# Patient Record
Sex: FEMALE | Race: BLACK OR AFRICAN AMERICAN | NOT HISPANIC OR LATINO | Employment: FULL TIME | URBAN - METROPOLITAN AREA
[De-identification: names, ages, dates, MRNs, and addresses within clinical notes are randomized per-mention and may not be internally consistent; named-entity substitution may affect disease eponyms.]

---

## 2022-08-31 ENCOUNTER — OFFICE VISIT (OUTPATIENT)
Dept: OBSTETRICS AND GYNECOLOGY | Facility: CLINIC | Age: 47
End: 2022-08-31
Payer: COMMERCIAL

## 2022-08-31 VITALS
WEIGHT: 163.56 LBS | BODY MASS INDEX: 28.98 KG/M2 | SYSTOLIC BLOOD PRESSURE: 116 MMHG | HEIGHT: 63 IN | DIASTOLIC BLOOD PRESSURE: 68 MMHG

## 2022-08-31 DIAGNOSIS — Z12.4 CERVICAL CANCER SCREENING: ICD-10-CM

## 2022-08-31 DIAGNOSIS — Z11.3 SCREEN FOR STD (SEXUALLY TRANSMITTED DISEASE): ICD-10-CM

## 2022-08-31 DIAGNOSIS — A60.04 HERPES SIMPLEX VULVOVAGINITIS: Primary | ICD-10-CM

## 2022-08-31 DIAGNOSIS — Z12.31 ENCOUNTER FOR SCREENING MAMMOGRAM FOR MALIGNANT NEOPLASM OF BREAST: ICD-10-CM

## 2022-08-31 PROCEDURE — 1159F MED LIST DOCD IN RCRD: CPT | Mod: CPTII,S$GLB,, | Performed by: OBSTETRICS & GYNECOLOGY

## 2022-08-31 PROCEDURE — 99999 PR PBB SHADOW E&M-NEW PATIENT-LVL III: ICD-10-PCS | Mod: PBBFAC,,, | Performed by: OBSTETRICS & GYNECOLOGY

## 2022-08-31 PROCEDURE — 3078F PR MOST RECENT DIASTOLIC BLOOD PRESSURE < 80 MM HG: ICD-10-PCS | Mod: CPTII,S$GLB,, | Performed by: OBSTETRICS & GYNECOLOGY

## 2022-08-31 PROCEDURE — 1159F PR MEDICATION LIST DOCUMENTED IN MEDICAL RECORD: ICD-10-PCS | Mod: CPTII,S$GLB,, | Performed by: OBSTETRICS & GYNECOLOGY

## 2022-08-31 PROCEDURE — 88175 CYTOPATH C/V AUTO FLUID REDO: CPT | Performed by: OBSTETRICS & GYNECOLOGY

## 2022-08-31 PROCEDURE — 99204 OFFICE O/P NEW MOD 45 MIN: CPT | Mod: 25,S$GLB,, | Performed by: OBSTETRICS & GYNECOLOGY

## 2022-08-31 PROCEDURE — 87210 SMEAR WET MOUNT SALINE/INK: CPT | Mod: QW,S$GLB,, | Performed by: OBSTETRICS & GYNECOLOGY

## 2022-08-31 PROCEDURE — 99999 PR PBB SHADOW E&M-NEW PATIENT-LVL III: CPT | Mod: PBBFAC,,, | Performed by: OBSTETRICS & GYNECOLOGY

## 2022-08-31 PROCEDURE — 87624 HPV HI-RISK TYP POOLED RSLT: CPT | Performed by: OBSTETRICS & GYNECOLOGY

## 2022-08-31 PROCEDURE — 3074F SYST BP LT 130 MM HG: CPT | Mod: CPTII,S$GLB,, | Performed by: OBSTETRICS & GYNECOLOGY

## 2022-08-31 PROCEDURE — 87491 CHLMYD TRACH DNA AMP PROBE: CPT | Performed by: OBSTETRICS & GYNECOLOGY

## 2022-08-31 PROCEDURE — 3008F BODY MASS INDEX DOCD: CPT | Mod: CPTII,S$GLB,, | Performed by: OBSTETRICS & GYNECOLOGY

## 2022-08-31 PROCEDURE — 3078F DIAST BP <80 MM HG: CPT | Mod: CPTII,S$GLB,, | Performed by: OBSTETRICS & GYNECOLOGY

## 2022-08-31 PROCEDURE — 87625 HPV TYPES 16 & 18 ONLY: CPT | Mod: 59 | Performed by: OBSTETRICS & GYNECOLOGY

## 2022-08-31 PROCEDURE — 3074F PR MOST RECENT SYSTOLIC BLOOD PRESSURE < 130 MM HG: ICD-10-PCS | Mod: CPTII,S$GLB,, | Performed by: OBSTETRICS & GYNECOLOGY

## 2022-08-31 PROCEDURE — 99204 PR OFFICE/OUTPT VISIT, NEW, LEVL IV, 45-59 MIN: ICD-10-PCS | Mod: 25,S$GLB,, | Performed by: OBSTETRICS & GYNECOLOGY

## 2022-08-31 PROCEDURE — 3008F PR BODY MASS INDEX (BMI) DOCUMENTED: ICD-10-PCS | Mod: CPTII,S$GLB,, | Performed by: OBSTETRICS & GYNECOLOGY

## 2022-08-31 PROCEDURE — 87210 PR  SMEAR,STAIN,WET MNT,INTERP: ICD-10-PCS | Mod: QW,S$GLB,, | Performed by: OBSTETRICS & GYNECOLOGY

## 2022-08-31 PROCEDURE — 87591 N.GONORRHOEAE DNA AMP PROB: CPT | Performed by: OBSTETRICS & GYNECOLOGY

## 2022-08-31 RX ORDER — ACYCLOVIR 400 MG/1
400 TABLET ORAL 2 TIMES DAILY
Qty: 60 TABLET | Refills: 11 | Status: SHIPPED | OUTPATIENT
Start: 2022-08-31 | End: 2023-01-09

## 2022-08-31 RX ORDER — MONTELUKAST SODIUM 10 MG/1
10 TABLET ORAL DAILY
COMMUNITY
Start: 2022-07-19

## 2022-08-31 RX ORDER — VALACYCLOVIR HYDROCHLORIDE 500 MG/1
500 TABLET, FILM COATED ORAL 2 TIMES DAILY
COMMUNITY
Start: 2022-08-27 | End: 2023-10-11

## 2022-08-31 RX ORDER — ACYCLOVIR 50 MG/G
OINTMENT TOPICAL
COMMUNITY
Start: 2022-08-29 | End: 2023-01-09

## 2022-09-01 LAB
C TRACH DNA SPEC QL NAA+PROBE: NOT DETECTED
N GONORRHOEA DNA SPEC QL NAA+PROBE: NOT DETECTED

## 2022-09-07 ENCOUNTER — HOSPITAL ENCOUNTER (OUTPATIENT)
Dept: RADIOLOGY | Facility: HOSPITAL | Age: 47
Discharge: HOME | End: 2022-09-07
Attending: OBSTETRICS & GYNECOLOGY
Payer: COMMERCIAL

## 2022-09-07 VITALS — HEIGHT: 63 IN | BODY MASS INDEX: 28.98 KG/M2 | WEIGHT: 163.56 LBS

## 2022-09-07 DIAGNOSIS — Z12.31 ENCOUNTER FOR SCREENING MAMMOGRAM FOR MALIGNANT NEOPLASM OF BREAST: ICD-10-CM

## 2022-09-07 PROCEDURE — 77067 SCR MAMMO BI INCL CAD: CPT | Mod: 26,,, | Performed by: RADIOLOGY

## 2022-09-07 PROCEDURE — 77063 MAMMO DIGITAL SCREENING BILAT WITH TOMO: ICD-10-PCS | Mod: 26,,, | Performed by: RADIOLOGY

## 2022-09-07 PROCEDURE — 77067 MAMMO DIGITAL SCREENING BILAT WITH TOMO: ICD-10-PCS | Mod: 26,,, | Performed by: RADIOLOGY

## 2022-09-07 PROCEDURE — 77063 BREAST TOMOSYNTHESIS BI: CPT | Mod: TC,PO

## 2022-09-07 PROCEDURE — 77063 BREAST TOMOSYNTHESIS BI: CPT | Mod: 26,,, | Performed by: RADIOLOGY

## 2022-09-08 ENCOUNTER — OFFICE VISIT (OUTPATIENT)
Dept: OTOLARYNGOLOGY | Facility: CLINIC | Age: 47
End: 2022-09-08
Payer: COMMERCIAL

## 2022-09-08 VITALS
TEMPERATURE: 99 F | HEART RATE: 104 BPM | BODY MASS INDEX: 28.82 KG/M2 | WEIGHT: 162.69 LBS | SYSTOLIC BLOOD PRESSURE: 127 MMHG | DIASTOLIC BLOOD PRESSURE: 80 MMHG

## 2022-09-08 DIAGNOSIS — K21.9 LARYNGOPHARYNGEAL REFLUX (LPR): ICD-10-CM

## 2022-09-08 DIAGNOSIS — J30.9 ALLERGIC RHINITIS, UNSPECIFIED SEASONALITY, UNSPECIFIED TRIGGER: Primary | ICD-10-CM

## 2022-09-08 PROCEDURE — 3008F PR BODY MASS INDEX (BMI) DOCUMENTED: ICD-10-PCS | Mod: CPTII,S$GLB,, | Performed by: STUDENT IN AN ORGANIZED HEALTH CARE EDUCATION/TRAINING PROGRAM

## 2022-09-08 PROCEDURE — 99203 OFFICE O/P NEW LOW 30 MIN: CPT | Mod: 25,S$GLB,, | Performed by: STUDENT IN AN ORGANIZED HEALTH CARE EDUCATION/TRAINING PROGRAM

## 2022-09-08 PROCEDURE — 31575 PR LARYNGOSCOPY, FLEXIBLE; DIAGNOSTIC: ICD-10-PCS | Mod: S$GLB,,, | Performed by: STUDENT IN AN ORGANIZED HEALTH CARE EDUCATION/TRAINING PROGRAM

## 2022-09-08 PROCEDURE — 99203 PR OFFICE/OUTPT VISIT, NEW, LEVL III, 30-44 MIN: ICD-10-PCS | Mod: 25,S$GLB,, | Performed by: STUDENT IN AN ORGANIZED HEALTH CARE EDUCATION/TRAINING PROGRAM

## 2022-09-08 PROCEDURE — 31575 DIAGNOSTIC LARYNGOSCOPY: CPT | Mod: S$GLB,,, | Performed by: STUDENT IN AN ORGANIZED HEALTH CARE EDUCATION/TRAINING PROGRAM

## 2022-09-08 PROCEDURE — 3079F PR MOST RECENT DIASTOLIC BLOOD PRESSURE 80-89 MM HG: ICD-10-PCS | Mod: CPTII,S$GLB,, | Performed by: STUDENT IN AN ORGANIZED HEALTH CARE EDUCATION/TRAINING PROGRAM

## 2022-09-08 PROCEDURE — 3074F SYST BP LT 130 MM HG: CPT | Mod: CPTII,S$GLB,, | Performed by: STUDENT IN AN ORGANIZED HEALTH CARE EDUCATION/TRAINING PROGRAM

## 2022-09-08 PROCEDURE — 3074F PR MOST RECENT SYSTOLIC BLOOD PRESSURE < 130 MM HG: ICD-10-PCS | Mod: CPTII,S$GLB,, | Performed by: STUDENT IN AN ORGANIZED HEALTH CARE EDUCATION/TRAINING PROGRAM

## 2022-09-08 PROCEDURE — 99999 PR PBB SHADOW E&M-EST. PATIENT-LVL III: CPT | Mod: PBBFAC,,, | Performed by: STUDENT IN AN ORGANIZED HEALTH CARE EDUCATION/TRAINING PROGRAM

## 2022-09-08 PROCEDURE — 3008F BODY MASS INDEX DOCD: CPT | Mod: CPTII,S$GLB,, | Performed by: STUDENT IN AN ORGANIZED HEALTH CARE EDUCATION/TRAINING PROGRAM

## 2022-09-08 PROCEDURE — 1159F PR MEDICATION LIST DOCUMENTED IN MEDICAL RECORD: ICD-10-PCS | Mod: CPTII,S$GLB,, | Performed by: STUDENT IN AN ORGANIZED HEALTH CARE EDUCATION/TRAINING PROGRAM

## 2022-09-08 PROCEDURE — 1159F MED LIST DOCD IN RCRD: CPT | Mod: CPTII,S$GLB,, | Performed by: STUDENT IN AN ORGANIZED HEALTH CARE EDUCATION/TRAINING PROGRAM

## 2022-09-08 PROCEDURE — 3079F DIAST BP 80-89 MM HG: CPT | Mod: CPTII,S$GLB,, | Performed by: STUDENT IN AN ORGANIZED HEALTH CARE EDUCATION/TRAINING PROGRAM

## 2022-09-08 PROCEDURE — 99999 PR PBB SHADOW E&M-EST. PATIENT-LVL III: ICD-10-PCS | Mod: PBBFAC,,, | Performed by: STUDENT IN AN ORGANIZED HEALTH CARE EDUCATION/TRAINING PROGRAM

## 2022-09-08 RX ORDER — FLUTICASONE PROPIONATE 50 MCG
1 SPRAY, SUSPENSION (ML) NASAL DAILY
Qty: 16 G | Refills: 0 | Status: SHIPPED | OUTPATIENT
Start: 2022-09-08 | End: 2022-09-26

## 2022-09-08 RX ORDER — ESOMEPRAZOLE MAGNESIUM 40 MG/1
40 CAPSULE, DELAYED RELEASE ORAL
Qty: 30 CAPSULE | Refills: 11 | Status: SHIPPED | OUTPATIENT
Start: 2022-09-08 | End: 2023-01-09

## 2022-09-08 NOTE — PATIENT INSTRUCTIONS
Laryngopharyngeal Reflux (LPR) Patient Information and Medication Instructions     LPR (laryngopharyngeal reflux) can be a challenging condition to control.  Some patients require once daily medication like a proton pump inhibitor to control their symptoms (such as Omeprazole, Pantoprazole, Esomeprazole).  HOWEVER, other patients will require taking this medication twice daily and even may be started on another medication called an H2 blocker (such as Pepcid, Zantac) at bedtime.    It is important that medication is not the only technique that you use to help control your LPR.  Therapeutic lifestyle changes are very important as well:      Weight Loss:  If you are overweight, losing weight can significantly reduce your reflux.  Diet Control:  It is important to determine what your Trigger foods for reflux are.  Limiting your intake of these foods will significantly improve your reflux.  Examples of foods known to increase reflux are listed below  Carbonated beverages  Caffeine (this includes Coffee, soda, iced tea, energy drinks etc.)  Alcohol  Fried/ fatty/ greasy foods  Acidic foods (citrus, tomato etc.)  Chocolate  Spearmint/Peppermint  Elevating the head of your bed:  This doesn't mean more pillows.  You need to actually elevate the head of your bed.  Some patients have found something to put under the legs of the head of their bed.  Other patients have employed a wedge or an air bladder of some sort to elevate the head of their bed.  This makes a significant difference with reflux and can also help with nasal congestion.  Eating before bedtime:  Try not to eat anything for at least 2 hours before bedtime.  This allows for less material to remain in your stomach when you lay down at night which equals less severe reflux.  More information:  If you would like to read more about diet changes and lifestyle changes that you can employ that will help with your reflux, the books below may be helpful.  Dropping Acid:   The Reflux diet Cookbook & Cure by En Macdonald M.D. and Sammy Polk M.D.  The Acid Watcher Diet:  A 28 Day Reflux Prevention and Healing Program by Александр Farley M.D.        Weaning Instructions After Symptom Improvement     It can sometimes take up to 12 weeks to see symptom improvement in LPR.  The medications may reduce the amount of acid you are producing very quickly, but then the tissues of your voice box and upper throat have to heal themselves.  Your physician may have increased year proton pump inhibitor to twice daily dosing and even may have added an H2 blocker at bedtime.  Eventually, the goal is a complete resolution of your symptoms.  Hopefully you have been working on the lifestyle modifications listed above over this time period as well!     Once your symptoms have improved, our goal is to wean you back off of your reflux medication.  The instructions below will help guide you through this process.     Take all anti-reflux medications for at least 3 weeks beyond when your symptoms have improved/resolved  If you are on Twice daily dosing of a proton pump inhibitor (omeprazole, pantoprazole, esomeprazole etc.) and an H2 blocker at bedtime (pepcid, zantac) then you should first discontinue your night time dose of your proton pump inhibitor.  If your symptoms are still controlled after 3 weeks of taking your PPI in the morning and your H2 blocker at bedtime,  discontinue your bedtime H2 blocker  If your symptoms are still controlled after 3 more weeks of only taking your PPI in the morning, discontinue your morning PPI     If at any point your symptoms return during this weaning process, you can return to the previous step and let your physician know at the next appointment.

## 2022-09-08 NOTE — PROGRESS NOTES
Chief complaint:    Chief Complaint   Patient presents with    Sinus Problem     Post nasal drip and constantly having to clear throat x few years           Referring Provider:  No referring provider defined for this encounter.      History of present illness:     Ms. Arreola is a 47 y.o. presenting for evaluation of throat clearing.     Usually exacerbated by post nasal drip.     Noticed 2 years ago, getting worse recently.   All day, throughout the day. Not worse with lying down.     Previously had some improvement with antihistamines and singular.     Currently using singular.     The patient denies neck mass, odynophagia, dysphagia, otalgia, unusual bleeding, or unintentional weight loss.      The patient has had about 1 or 2 sinus infections in the past 12 months.     Prior sinus surgery: no.    Reflux: yes, has been worse again recently    Allergy history: denies     Asthma history: No    NSAID/ASA allergy: No     Current smoker:  No       History      Past Medical History:   Past Medical History:   Diagnosis Date    Acquired scoliosis     Gastroesophageal reflux disease     Herpes simplex virus (HSV) infection          Past Surgical History:No past surgical history on file.      Medications: Medication list reviewed. She  has a current medication list which includes the following prescription(s): acyclovir, acyclovir 5%, montelukast, esomeprazole, fluticasone propionate, and valacyclovir.     Allergies:   Review of patient's allergies indicates:   Allergen Reactions    Penicillins          Family history: family history includes Breast cancer in her mother; Cancer in her mother; Heart disease in her maternal grandfather, maternal grandmother, and mother; Hypertension in her mother.         Social History          Alcohol use:  reports current alcohol use.            Tobacco:  reports that she has never smoked. She has never used smokeless tobacco.         Physical Examination      Vitals: Blood pressure 127/80,  pulse 104, temperature 98.6 °F (37 °C), temperature source Temporal, weight 73.8 kg (162 lb 11.2 oz), last menstrual period 08/13/2022.      General: Well developed, well nourished, well hydrated.     Voice: no dysphonia, no dysarthria      Head/Face: Normocephalic, atraumatic. No scars or lesions. Facial musculature equal.     Eyes: No scleral icterus or conjunctival hemorrhage. EOMI. PERRLA.     Ears:     Right ear: No gross deformity. EAC is clear of debris and erythema. Moderate wax.. TM are intact with a pneumatized middle ear. No signs of retraction, fluid or infection.      Left ear: No gross deformity. EAC is clear of debris and erythema. Moderate wax. TM are intact with a pneumatized middle ear. No signs of retraction, fluid or infection.      Nose: No gross deformity or lesions. No purulent discharge. No significant NSD.     Mouth/Oropharynx: Lips without any lesions. No mucosal lesions within the oropharynx. No tonsillar exudate or lesions. Pharyngeal walls symmetrical. Uvula midline. Tongue midline without lesions.     Neurologic: Moving all extremities without gross abnormality.CN II-XII grossly intact. House-Brackmann 1/6. No signs of nystagmus.          Data reviewed      Review of records:      I reviewed records from the referring provider's office visits describing the history, workup, and/or treatment of this problem thus far.       Procedures:    Procedure -Transnasal fiberoptic laryngoscopy     Surgeon: Eric Momin M.D. .      Anesthesia: topical 0.05% oxymetazoline with 4% lidocaine      Complications: None.     Description of Procedure: With the patient in the sitting position, topical lidocaine and oxymetazoline was applied to the nose. The scope was passed through the nose. Examination was carried out of the nose, nasopharynx, oropharynx, hypopharynx, and larynx with findings as noted below. Scope was removed. The patient tolerated the procedure well.      Findings: Normal vocal fold  motion. No masses or lesions of the nose, nasopharynx, oropharynx, hypopharynx, or larynx. Erythema and post cricoid redundancy consistent with laryngopharyngeal reflux         Assessment/Plan:    1. Allergic rhinitis, unspecified seasonality, unspecified trigger    2. Laryngopharyngeal reflux (LPR)        Laryngopharyngeal Reflux (LPR) Patient Information and Medication Instructions     LPR (laryngopharyngeal reflux) can be a challenging condition to control.  Some patients require once daily medication like a proton pump inhibitor to control their symptoms (such as Omeprazole, Pantoprazole, Esomeprazole).  HOWEVER, other patients will require taking this medication twice daily and even may be started on another medication called an H2 blocker (such as Pepcid, Zantac) at bedtime.    It is important that medication is not the only technique that you use to help control your LPR.  Therapeutic lifestyle changes are very important as well:      Weight Loss:  If you are overweight, losing weight can significantly reduce your reflux.  Diet Control:  It is important to determine what your Trigger foods for reflux are.  Limiting your intake of these foods will significantly improve your reflux.  Examples of foods known to increase reflux are listed below  Carbonated beverages  Caffeine (this includes Coffee, soda, iced tea, energy drinks etc.)  Alcohol  Fried/ fatty/ greasy foods  Acidic foods (citrus, tomato etc.)  Chocolate  Spearmint/Peppermint  Elevating the head of your bed:  This doesn't mean more pillows.  You need to actually elevate the head of your bed.  Some patients have found something to put under the legs of the head of their bed.  Other patients have employed a wedge or an air bladder of some sort to elevate the head of their bed.  This makes a significant difference with reflux and can also help with nasal congestion.  Eating before bedtime:  Try not to eat anything for at least 2 hours before bedtime.   This allows for less material to remain in your stomach when you lay down at night which equals less severe reflux.  More information:  If you would like to read more about diet changes and lifestyle changes that you can employ that will help with your reflux, the books below may be helpful.  Dropping Acid:  The Reflux diet Cookbook & Cure by En Macdonald M.D. and Sammy Polk M.D.  The Acid Watcher Diet:  A 28 Day Reflux Prevention and Healing Program by Александр Farley M.D.        Weaning Instructions After Symptom Improvement     It can sometimes take up to 12 weeks to see symptom improvement in LPR.  The medications may reduce the amount of acid you are producing very quickly, but then the tissues of your voice box and upper throat have to heal themselves.  Your physician may have increased year proton pump inhibitor to twice daily dosing and even may have added an H2 blocker at bedtime.  Eventually, the goal is a complete resolution of your symptoms.  Hopefully you have been working on the lifestyle modifications listed above over this time period as well!     Once your symptoms have improved, our goal is to wean you back off of your reflux medication.  The instructions below will help guide you through this process.     Take all anti-reflux medications for at least 3 weeks beyond when your symptoms have improved/resolved  If you are on Twice daily dosing of a proton pump inhibitor (omeprazole, pantoprazole, esomeprazole etc.) and an H2 blocker at bedtime (pepcid, zantac) then you should first discontinue your night time dose of your proton pump inhibitor.  If your symptoms are still controlled after 3 weeks of taking your PPI in the morning and your H2 blocker at bedtime,  discontinue your bedtime H2 blocker  If your symptoms are still controlled after 3 more weeks of only taking your PPI in the morning, discontinue your morning PPI     If at any point your symptoms return during this weaning process, you can  return to the previous step and let your physician know at the next appointment.         She will also restart Flonase and we discussed measures to reduce throat clearing.    Return to clinic 2-3 months if not improved      Eric Momin MD  [facility]   [facility] - [address]  [place] [address]  P: [phone]  F: [phone]

## 2022-09-13 LAB
CLINICAL INFO: NORMAL
CYTO CVX: NORMAL
CYTOLOGIST CVX/VAG CYTO: NORMAL
CYTOLOGIST CVX/VAG CYTO: NORMAL
CYTOLOGY CMNT CVX/VAG CYTO-IMP: NORMAL
CYTOLOGY PAP THIN PREP EXPLANATION: NORMAL
DATE OF PREVIOUS PAP: NORMAL
DATE PREVIOUS BX: NO
GEN CATEG CVX/VAG CYTO-IMP: NORMAL
HPV I/H RISK 4 DNA CVX QL NAA+PROBE: DETECTED
HPV16 DNA CVX QL PROBE+SIG AMP: NOT DETECTED
HPV18 DNA CVX QL PROBE+SIG AMP: NOT DETECTED
LMP START DATE: NORMAL
MICROORGANISM CVX/VAG CYTO: NORMAL
PATHOLOGIST CVX/VAG CYTO: NORMAL
SERVICE CMNT-IMP: NORMAL
SPECIMEN SOURCE CVX/VAG CYTO: NORMAL
STAT OF ADQ CVX/VAG CYTO-IMP: NORMAL

## 2022-09-16 ENCOUNTER — HOSPITAL ENCOUNTER (OUTPATIENT)
Dept: RADIOLOGY | Facility: HOSPITAL | Age: 47
Discharge: HOME | End: 2022-09-16
Attending: OBSTETRICS & GYNECOLOGY
Payer: COMMERCIAL

## 2022-09-16 DIAGNOSIS — R92.8 ABNORMAL MAMMOGRAM: ICD-10-CM

## 2022-09-16 PROCEDURE — 77061 MAMMO DIGITAL DIAGNOSTIC RIGHT WITH TOMO: ICD-10-PCS | Mod: 26,RT,, | Performed by: RADIOLOGY

## 2022-09-16 PROCEDURE — 77065 DX MAMMO INCL CAD UNI: CPT | Mod: 26,RT,, | Performed by: RADIOLOGY

## 2022-09-16 PROCEDURE — 76642 ULTRASOUND BREAST LIMITED: CPT | Mod: 26,RT,, | Performed by: RADIOLOGY

## 2022-09-16 PROCEDURE — 77061 BREAST TOMOSYNTHESIS UNI: CPT | Mod: 26,RT,, | Performed by: RADIOLOGY

## 2022-09-16 PROCEDURE — 76642 ULTRASOUND BREAST LIMITED: CPT | Mod: TC,RT

## 2022-09-16 PROCEDURE — 77065 DX MAMMO INCL CAD UNI: CPT | Mod: TC,RT

## 2022-09-16 PROCEDURE — 77065 MAMMO DIGITAL DIAGNOSTIC RIGHT WITH TOMO: ICD-10-PCS | Mod: 26,RT,, | Performed by: RADIOLOGY

## 2022-09-16 PROCEDURE — 76642 US BREAST RIGHT LIMITED: ICD-10-PCS | Mod: 26,RT,, | Performed by: RADIOLOGY

## 2023-01-09 ENCOUNTER — LAB VISIT (OUTPATIENT)
Dept: LAB | Facility: HOSPITAL | Age: 48
End: 2023-01-09
Attending: FAMILY MEDICINE
Payer: COMMERCIAL

## 2023-01-09 ENCOUNTER — OFFICE VISIT (OUTPATIENT)
Dept: INTERNAL MEDICINE | Facility: CLINIC | Age: 48
End: 2023-01-09
Payer: COMMERCIAL

## 2023-01-09 VITALS
OXYGEN SATURATION: 98 % | SYSTOLIC BLOOD PRESSURE: 108 MMHG | DIASTOLIC BLOOD PRESSURE: 76 MMHG | WEIGHT: 168.44 LBS | BODY MASS INDEX: 29.84 KG/M2 | TEMPERATURE: 97 F | HEART RATE: 75 BPM | HEIGHT: 63 IN | RESPIRATION RATE: 18 BRPM

## 2023-01-09 DIAGNOSIS — Z11.4 ENCOUNTER FOR SCREENING FOR HIV: ICD-10-CM

## 2023-01-09 DIAGNOSIS — Z00.00 ROUTINE PHYSICAL EXAMINATION: ICD-10-CM

## 2023-01-09 DIAGNOSIS — Z11.59 ENCOUNTER FOR HEPATITIS C SCREENING TEST FOR LOW RISK PATIENT: ICD-10-CM

## 2023-01-09 DIAGNOSIS — Z00.00 ROUTINE PHYSICAL EXAMINATION: Primary | ICD-10-CM

## 2023-01-09 DIAGNOSIS — Z12.11 COLON CANCER SCREENING: ICD-10-CM

## 2023-01-09 LAB
ALBUMIN SERPL BCP-MCNC: 3.7 G/DL (ref 3.5–5.2)
ALP SERPL-CCNC: 56 U/L (ref 55–135)
ALT SERPL W/O P-5'-P-CCNC: 11 U/L (ref 10–44)
ANION GAP SERPL CALC-SCNC: 10 MMOL/L (ref 8–16)
AST SERPL-CCNC: 13 U/L (ref 10–40)
BASOPHILS # BLD AUTO: 0.04 K/UL (ref 0–0.2)
BASOPHILS NFR BLD: 0.8 % (ref 0–1.9)
BILIRUB SERPL-MCNC: 0.5 MG/DL (ref 0.1–1)
BUN SERPL-MCNC: 10 MG/DL (ref 6–20)
CALCIUM SERPL-MCNC: 9.6 MG/DL (ref 8.7–10.5)
CHLORIDE SERPL-SCNC: 105 MMOL/L (ref 95–110)
CHOLEST SERPL-MCNC: 168 MG/DL (ref 120–199)
CHOLEST/HDLC SERPL: 3.2 {RATIO} (ref 2–5)
CO2 SERPL-SCNC: 21 MMOL/L (ref 23–29)
CREAT SERPL-MCNC: 0.7 MG/DL (ref 0.5–1.4)
DIFFERENTIAL METHOD: NORMAL
EOSINOPHIL # BLD AUTO: 0 K/UL (ref 0–0.5)
EOSINOPHIL NFR BLD: 0.6 % (ref 0–8)
ERYTHROCYTE [DISTWIDTH] IN BLOOD BY AUTOMATED COUNT: 12.7 % (ref 11.5–14.5)
EST. GFR  (NO RACE VARIABLE): >60 ML/MIN/1.73 M^2
GLUCOSE SERPL-MCNC: 84 MG/DL (ref 70–110)
HCT VFR BLD AUTO: 38.9 % (ref 37–48.5)
HCV AB SERPL QL IA: NORMAL
HDLC SERPL-MCNC: 52 MG/DL (ref 40–75)
HDLC SERPL: 31 % (ref 20–50)
HGB BLD-MCNC: 13.4 G/DL (ref 12–16)
HIV 1+2 AB+HIV1 P24 AG SERPL QL IA: NORMAL
IMM GRANULOCYTES # BLD AUTO: 0.01 K/UL (ref 0–0.04)
IMM GRANULOCYTES NFR BLD AUTO: 0.2 % (ref 0–0.5)
LDLC SERPL CALC-MCNC: 101.4 MG/DL (ref 63–159)
LYMPHOCYTES # BLD AUTO: 1.8 K/UL (ref 1–4.8)
LYMPHOCYTES NFR BLD: 35.9 % (ref 18–48)
MCH RBC QN AUTO: 29.1 PG (ref 27–31)
MCHC RBC AUTO-ENTMCNC: 34.4 G/DL (ref 32–36)
MCV RBC AUTO: 85 FL (ref 82–98)
MONOCYTES # BLD AUTO: 0.4 K/UL (ref 0.3–1)
MONOCYTES NFR BLD: 7 % (ref 4–15)
NEUTROPHILS # BLD AUTO: 2.8 K/UL (ref 1.8–7.7)
NEUTROPHILS NFR BLD: 55.5 % (ref 38–73)
NONHDLC SERPL-MCNC: 116 MG/DL
NRBC BLD-RTO: 0 /100 WBC
PLATELET # BLD AUTO: 390 K/UL (ref 150–450)
PMV BLD AUTO: 9.6 FL (ref 9.2–12.9)
POTASSIUM SERPL-SCNC: 4.1 MMOL/L (ref 3.5–5.1)
PROT SERPL-MCNC: 7.6 G/DL (ref 6–8.4)
RBC # BLD AUTO: 4.6 M/UL (ref 4–5.4)
SODIUM SERPL-SCNC: 136 MMOL/L (ref 136–145)
TRIGL SERPL-MCNC: 73 MG/DL (ref 30–150)
TSH SERPL DL<=0.005 MIU/L-ACNC: 2.43 UIU/ML (ref 0.4–4)
WBC # BLD AUTO: 4.98 K/UL (ref 3.9–12.7)

## 2023-01-09 PROCEDURE — 99999 PR PBB SHADOW E&M-EST. PATIENT-LVL IV: ICD-10-PCS | Mod: PBBFAC,,, | Performed by: FAMILY MEDICINE

## 2023-01-09 PROCEDURE — 80053 COMPREHEN METABOLIC PANEL: CPT | Performed by: FAMILY MEDICINE

## 2023-01-09 PROCEDURE — 86803 HEPATITIS C AB TEST: CPT | Performed by: FAMILY MEDICINE

## 2023-01-09 PROCEDURE — 36415 COLL VENOUS BLD VENIPUNCTURE: CPT | Performed by: FAMILY MEDICINE

## 2023-01-09 PROCEDURE — 84443 ASSAY THYROID STIM HORMONE: CPT | Performed by: FAMILY MEDICINE

## 2023-01-09 PROCEDURE — 99396 PR PREVENTIVE VISIT,EST,40-64: ICD-10-PCS | Mod: S$GLB,,, | Performed by: FAMILY MEDICINE

## 2023-01-09 PROCEDURE — 85025 COMPLETE CBC W/AUTO DIFF WBC: CPT | Performed by: FAMILY MEDICINE

## 2023-01-09 PROCEDURE — 99999 PR PBB SHADOW E&M-EST. PATIENT-LVL IV: CPT | Mod: PBBFAC,,, | Performed by: FAMILY MEDICINE

## 2023-01-09 PROCEDURE — 99396 PREV VISIT EST AGE 40-64: CPT | Mod: S$GLB,,, | Performed by: FAMILY MEDICINE

## 2023-01-09 PROCEDURE — 80061 LIPID PANEL: CPT | Performed by: FAMILY MEDICINE

## 2023-01-09 PROCEDURE — 87389 HIV-1 AG W/HIV-1&-2 AB AG IA: CPT | Performed by: FAMILY MEDICINE

## 2023-01-09 RX ORDER — IBUPROFEN 800 MG/1
800 TABLET ORAL EVERY 8 HOURS PRN
COMMUNITY
Start: 2022-12-29 | End: 2023-03-22 | Stop reason: ALTCHOICE

## 2023-01-09 RX ORDER — CHLORHEXIDINE GLUCONATE ORAL RINSE 1.2 MG/ML
SOLUTION DENTAL
COMMUNITY
Start: 2022-12-29 | End: 2023-10-11

## 2023-01-09 NOTE — PROGRESS NOTES
Kathia Arreola  01/09/2023  [id number]    Martine Nicolas MD  Patient Care Team:  Martine Nicolas MD as PCP - General (Internal Medicine)    Has the patient seen any provider outside of the network since the last visit ? (no). If yes, HIPPA forms completed and records requested.      Visit Type:a scheduled routine follow-up visit    Chief Complaint:  Chief Complaint   Patient presents with    Annual Exam    Establish Care       History of Present Illness:  HPI Ms. Arreola presents today for her annual exam.  She has a medical history surgical history and family history as.  Listed below health maintenance reviewed and updated today      Patient has problems with constipation from time to time.  She she takes MiraLax would does not drink enough water.  So it has not helped much    Review of Systems   Constitutional:  Negative for chills and fever.   HENT:  Negative for congestion and tinnitus.    Eyes:  Negative for blurred vision, pain and discharge.   Respiratory:  Negative for cough and wheezing.    Cardiovascular:  Negative for chest pain, palpitations, orthopnea and leg swelling.   Gastrointestinal:  Negative for abdominal pain, blood in stool, constipation, diarrhea, heartburn, nausea and vomiting.   Genitourinary:  Negative for dysuria, flank pain, frequency, hematuria and urgency.   Skin:  Negative for itching and rash.   Neurological:  Negative for dizziness, tingling and headaches.   Psychiatric/Behavioral:  Negative for depression.        Screening Questionnaires:    In the last two weeks how often have you felt down, depressed, or hopeless ( intermittent )    In the last two weeks how often have you had little interest or pleasure in doing  (intermittent )    In the last two weeks how often have you been bothered by the following problems:  1. Feeling nervous, anxious, or on edge ( intermittent )    2. Not being able to stop or control worrying ( no)    3. Worrying too much about different things (  no)    4. Trouble relaxing ( intermittent )    5. Being so restless that it is hard to sit still  (intermittent )    6. Becoming easily annoyed or irritable (intermittent)    7. Feeling afraid as if something awful might happen (intermittent )    How often do you have a drink containing Alcohol? occasional, social use     Do you exercise  (no ) not active    Do you take a baby Aspirin daily ( no)    Do you have an advance directive ( no ) The patient was given information regarding Living Will/Durable Power-of- if requested.     The following were reviewed: Active problem list, medication list, allergies, family history, social history, and Health Maintenance.     History:  Past Medical History:   Diagnosis Date    Acquired scoliosis     Gastroesophageal reflux disease     Herpes simplex virus (HSV) infection      No past surgical history on file.  Family History   Problem Relation Age of Onset    Breast cancer Mother     Cancer Mother     Heart disease Mother     Hypertension Mother     Cerebral aneurysm Mother     Hypertension Maternal Grandmother     Heart disease Maternal Grandmother     Hypertension Maternal Grandfather     Heart disease Maternal Grandfather     Diabetes Neg Hx      Social History     Socioeconomic History    Marital status: Single   Tobacco Use    Smoking status: Never    Smokeless tobacco: Never   Substance and Sexual Activity    Alcohol use: Yes     Comment: occ    Drug use: No    Sexual activity: Not Currently     Partners: Male     Birth control/protection: Condom     Patient Active Problem List   Diagnosis    Herpes simplex vulvovaginitis     Review of patient's allergies indicates:   Allergen Reactions    Penicillins        Health Maintenance  Health Maintenance Topics with due status: Not Due       Topic Last Completion Date    TETANUS VACCINE 12/19/2017    Cervical Cancer Screening 08/31/2022    Mammogram 09/16/2022     Health Maintenance Due   Topic Date Due    Hepatitis C  Screening  Never done    HIV Screening  Never done    COVID-19 Vaccine (4 - Booster for Moderna series) 04/28/2022    Colorectal Cancer Screening  Never done    Influenza Vaccine (1) Never done    Lipid Panel  12/19/2022       Medications:  Current Outpatient Medications on File Prior to Visit   Medication Sig Dispense Refill    chlorhexidine (PERIDEX) 0.12 % solution SMARTSIG:15 By Mouth Twice Daily      fluticasone propionate (FLONASE) 50 mcg/actuation nasal spray 1 spray (50 mcg total) by Each Nostril route once daily. 24 mL 11    ibuprofen (ADVIL,MOTRIN) 800 MG tablet Take 800 mg by mouth every 8 (eight) hours as needed.      montelukast (SINGULAIR) 10 mg tablet Take 10 mg by mouth once daily.      valACYclovir (VALTREX) 500 MG tablet Take 500 mg by mouth 2 (two) times daily.      [DISCONTINUED] acyclovir (ZOVIRAX) 400 MG tablet Take 1 tablet (400 mg total) by mouth 2 (two) times daily. (Patient not taking: Reported on 1/9/2023) 60 tablet 11    [DISCONTINUED] acyclovir 5% (ZOVIRAX) 5 % ointment Apply topically.      [DISCONTINUED] esomeprazole (NEXIUM) 40 MG capsule Take 1 capsule (40 mg total) by mouth before breakfast. (Patient not taking: Reported on 1/9/2023) 30 capsule 11     No current facility-administered medications on file prior to visit.       Medications have been reviewed and reconciled with patient at visit today.    Barriers to medications present (no )    Adverse reactions to current medications (no)    Over the counter medications reviewed (Yes) and if needed added to active Medication list.    Exam:  Vitals:    01/09/23 0718   BP: 108/76   Pulse: 75   Resp: 18   Temp: 97.1 °F (36.2 °C)     Weight: 76.4 kg (168 lb 6.9 oz)   Body mass index is 29.84 kg/m².      Physical Exam  Vitals reviewed.   Constitutional:       General: She is not in acute distress.     Appearance: Normal appearance.   HENT:      Head: Normocephalic and atraumatic.      Right Ear: External ear normal.      Left Ear: External  ear normal.      Nose: Nose normal.   Eyes:      General:         Right eye: No discharge.         Left eye: No discharge.      Pupils: Pupils are equal, round, and reactive to light.   Neck:      Thyroid: No thyromegaly.   Cardiovascular:      Rate and Rhythm: Normal rate and regular rhythm.      Heart sounds: Normal heart sounds. No murmur heard.  Pulmonary:      Effort: Pulmonary effort is normal. No respiratory distress.      Breath sounds: Normal breath sounds. No wheezing.   Abdominal:      General: Bowel sounds are normal. There is no distension.      Palpations: Abdomen is soft.      Tenderness: There is no abdominal tenderness.   Musculoskeletal:         General: Normal range of motion.      Cervical back: Normal range of motion and neck supple.   Skin:     General: Skin is warm and dry.      Findings: No rash.   Neurological:      Mental Status: She is alert and oriented to person, place, and time.      Coordination: Coordination normal.   Psychiatric:         Behavior: Behavior normal.       Laboratory Reviewed: (Yes)  Lab Results   Component Value Date    CHOL 145 12/19/2017    TRIG 54 12/19/2017    HDL 59 12/19/2017    ALT 10 12/19/2017    AST 13 12/19/2017     12/19/2017    K 4.1 12/19/2017     12/19/2017    CREATININE 0.53 (L) 12/19/2017    BUN 13 12/19/2017    CO2 23 12/19/2017    TSH 2.030 12/19/2017       Assessment:  The primary encounter diagnosis was Routine physical examination. Diagnoses of Encounter for screening for HIV, Encounter for hepatitis C screening test for low risk patient, and Colon cancer screening were also pertinent to this visit.    Plan:  Routine physical examination  -     Lipid Panel; Future; Expected date: 01/09/2023  -     COMPREHENSIVE METABOLIC PANEL; Future; Expected date: 01/09/2023  -     CBC Auto Differential; Future; Expected date: 01/09/2023  -     TSH; Future; Expected date: 01/09/2023    Encounter for screening for HIV  -     HIV 1/2 Ag/Ab (4th Gen);  Future; Expected date: 01/09/2023    Encounter for hepatitis C screening test for low risk patient  -     Hepatitis C Antibody; Future; Expected date: 01/09/2023    Colon cancer screening  -     Fecal Immunochemical Test (iFOBT); Future; Expected date: 01/09/2023      -Patient's lab results were reviewed and discussed with patient  -Treatment options and alternatives were discussed with the patient. Patient expressed understanding. Patient was given the opportunity to ask questions and be an active participant in their medical care. Patient had no further questions or concerns at this time.   -Documentation of patient's health and condition was obtained from family member who was present during visit.  -Patient is an overall moderate risk for health complications from their medical conditions.     Follow up: follow up 1 year sooner if needed      Care Plan/Goals: Reviewed N/A   Goals    None             After visit summary printed and given to patient upon discharge.  Patient goals and care plan are included in After visit summary.

## 2023-01-10 PROCEDURE — 82274 ASSAY TEST FOR BLOOD FECAL: CPT | Performed by: FAMILY MEDICINE

## 2023-03-21 ENCOUNTER — HOSPITAL ENCOUNTER (EMERGENCY)
Facility: HOSPITAL | Age: 48
Discharge: HOME | End: 2023-03-22
Attending: EMERGENCY MEDICINE
Payer: COMMERCIAL

## 2023-03-21 DIAGNOSIS — R31.9 URINARY TRACT INFECTION WITH HEMATURIA, SITE UNSPECIFIED: Primary | ICD-10-CM

## 2023-03-21 DIAGNOSIS — J90 PLEURAL EFFUSION, BILATERAL: ICD-10-CM

## 2023-03-21 DIAGNOSIS — R11.2 NAUSEA AND VOMITING, UNSPECIFIED VOMITING TYPE: ICD-10-CM

## 2023-03-21 DIAGNOSIS — R05.9 COUGH: ICD-10-CM

## 2023-03-21 DIAGNOSIS — N39.0 URINARY TRACT INFECTION WITH HEMATURIA, SITE UNSPECIFIED: Primary | ICD-10-CM

## 2023-03-21 LAB
B-HCG UR QL: NEGATIVE
BASOPHILS # BLD AUTO: 0.04 K/UL (ref 0–0.2)
BASOPHILS NFR BLD: 0.3 % (ref 0–1.9)
BILIRUB UR QL STRIP: NEGATIVE
CLARITY UR REFRACT.AUTO: ABNORMAL
COLOR UR AUTO: YELLOW
CTP QC/QA: YES
CTP QC/QA: YES
DIFFERENTIAL METHOD: ABNORMAL
EOSINOPHIL # BLD AUTO: 0 K/UL (ref 0–0.5)
EOSINOPHIL NFR BLD: 0.2 % (ref 0–8)
ERYTHROCYTE [DISTWIDTH] IN BLOOD BY AUTOMATED COUNT: 12.1 % (ref 11.5–14.5)
GLUCOSE UR QL STRIP: NEGATIVE
HCT VFR BLD AUTO: 36.6 % (ref 37–48.5)
HGB BLD-MCNC: 13 G/DL (ref 12–16)
HGB UR QL STRIP: ABNORMAL
IMM GRANULOCYTES # BLD AUTO: 0.06 K/UL (ref 0–0.04)
IMM GRANULOCYTES NFR BLD AUTO: 0.5 % (ref 0–0.5)
KETONES UR QL STRIP: NEGATIVE
LEUKOCYTE ESTERASE UR QL STRIP: ABNORMAL
LYMPHOCYTES # BLD AUTO: 1.7 K/UL (ref 1–4.8)
LYMPHOCYTES NFR BLD: 13.8 % (ref 18–48)
MCH RBC QN AUTO: 29 PG (ref 27–31)
MCHC RBC AUTO-ENTMCNC: 35.5 G/DL (ref 32–36)
MCV RBC AUTO: 82 FL (ref 82–98)
MONOCYTES # BLD AUTO: 0.7 K/UL (ref 0.3–1)
MONOCYTES NFR BLD: 5.4 % (ref 4–15)
NEUTROPHILS # BLD AUTO: 9.9 K/UL (ref 1.8–7.7)
NEUTROPHILS NFR BLD: 79.8 % (ref 38–73)
NITRITE UR QL STRIP: NEGATIVE
NRBC BLD-RTO: 0 /100 WBC
PH UR STRIP: 7 [PH] (ref 5–8)
PLATELET # BLD AUTO: 449 K/UL (ref 150–450)
PMV BLD AUTO: 8.9 FL (ref 9.2–12.9)
POC MOLECULAR INFLUENZA A AGN: NEGATIVE
POC MOLECULAR INFLUENZA B AGN: NEGATIVE
PROT UR QL STRIP: NEGATIVE
RBC # BLD AUTO: 4.49 M/UL (ref 4–5.4)
SARS-COV-2 RDRP RESP QL NAA+PROBE: NEGATIVE
SP GR UR STRIP: 1.01 (ref 1–1.03)
URN SPEC COLLECT METH UR: ABNORMAL
UROBILINOGEN UR STRIP-ACNC: NEGATIVE EU/DL
WBC # BLD AUTO: 12.33 K/UL (ref 3.9–12.7)

## 2023-03-21 PROCEDURE — 96375 TX/PRO/DX INJ NEW DRUG ADDON: CPT | Mod: ER

## 2023-03-21 PROCEDURE — 85025 COMPLETE CBC W/AUTO DIFF WBC: CPT | Mod: ER | Performed by: EMERGENCY MEDICINE

## 2023-03-21 PROCEDURE — 96374 THER/PROPH/DIAG INJ IV PUSH: CPT | Mod: ER

## 2023-03-21 PROCEDURE — 83690 ASSAY OF LIPASE: CPT | Mod: ER | Performed by: EMERGENCY MEDICINE

## 2023-03-21 PROCEDURE — 87502 INFLUENZA DNA AMP PROBE: CPT | Mod: ER

## 2023-03-21 PROCEDURE — 81025 URINE PREGNANCY TEST: CPT | Mod: ER | Performed by: EMERGENCY MEDICINE

## 2023-03-21 PROCEDURE — 96361 HYDRATE IV INFUSION ADD-ON: CPT | Mod: ER

## 2023-03-21 PROCEDURE — 81000 URINALYSIS NONAUTO W/SCOPE: CPT | Mod: ER | Performed by: EMERGENCY MEDICINE

## 2023-03-21 PROCEDURE — 25000003 PHARM REV CODE 250: Mod: ER | Performed by: EMERGENCY MEDICINE

## 2023-03-21 PROCEDURE — 80053 COMPREHEN METABOLIC PANEL: CPT | Mod: ER | Performed by: EMERGENCY MEDICINE

## 2023-03-21 PROCEDURE — 99285 EMERGENCY DEPT VISIT HI MDM: CPT | Mod: 25,ER

## 2023-03-21 PROCEDURE — 63600175 PHARM REV CODE 636 W HCPCS: Mod: ER | Performed by: EMERGENCY MEDICINE

## 2023-03-21 RX ORDER — ONDANSETRON 2 MG/ML
4 INJECTION INTRAMUSCULAR; INTRAVENOUS
Status: COMPLETED | OUTPATIENT
Start: 2023-03-21 | End: 2023-03-21

## 2023-03-21 RX ADMIN — SODIUM CHLORIDE 1000 ML: 9 INJECTION, SOLUTION INTRAVENOUS at 11:03

## 2023-03-21 RX ADMIN — ONDANSETRON 4 MG: 2 INJECTION INTRAMUSCULAR; INTRAVENOUS at 11:03

## 2023-03-22 ENCOUNTER — OFFICE VISIT (OUTPATIENT)
Dept: PULMONOLOGY | Facility: CLINIC | Age: 48
End: 2023-03-22
Payer: COMMERCIAL

## 2023-03-22 VITALS
SYSTOLIC BLOOD PRESSURE: 128 MMHG | HEART RATE: 82 BPM | BODY MASS INDEX: 30.07 KG/M2 | WEIGHT: 169.75 LBS | RESPIRATION RATE: 18 BRPM | DIASTOLIC BLOOD PRESSURE: 71 MMHG | OXYGEN SATURATION: 99 % | TEMPERATURE: 99 F

## 2023-03-22 VITALS
OXYGEN SATURATION: 98 % | HEIGHT: 63 IN | DIASTOLIC BLOOD PRESSURE: 92 MMHG | SYSTOLIC BLOOD PRESSURE: 142 MMHG | RESPIRATION RATE: 18 BRPM | BODY MASS INDEX: 30.02 KG/M2 | WEIGHT: 169.44 LBS | HEART RATE: 85 BPM

## 2023-03-22 DIAGNOSIS — Z87.898 HISTORY OF LEFT FLANK PAIN: ICD-10-CM

## 2023-03-22 DIAGNOSIS — J40 BRONCHITIS: Primary | ICD-10-CM

## 2023-03-22 LAB
ALBUMIN SERPL BCP-MCNC: 3.8 G/DL (ref 3.5–5.2)
ALP SERPL-CCNC: 74 U/L (ref 55–135)
ALT SERPL W/O P-5'-P-CCNC: 10 U/L (ref 10–44)
ANION GAP SERPL CALC-SCNC: 13 MMOL/L (ref 8–16)
AST SERPL-CCNC: 12 U/L (ref 10–40)
BACTERIA #/AREA URNS AUTO: ABNORMAL /HPF
BILIRUB SERPL-MCNC: 0.6 MG/DL (ref 0.1–1)
BUN SERPL-MCNC: 5 MG/DL (ref 6–20)
CALCIUM SERPL-MCNC: 9.5 MG/DL (ref 8.7–10.5)
CHLORIDE SERPL-SCNC: 99 MMOL/L (ref 95–110)
CO2 SERPL-SCNC: 22 MMOL/L (ref 23–29)
CREAT SERPL-MCNC: 0.7 MG/DL (ref 0.5–1.4)
EST. GFR  (NO RACE VARIABLE): >60 ML/MIN/1.73 M^2
GLUCOSE SERPL-MCNC: 110 MG/DL (ref 70–110)
LIPASE SERPL-CCNC: 12 U/L (ref 4–60)
MICROSCOPIC COMMENT: ABNORMAL
POTASSIUM SERPL-SCNC: 3.8 MMOL/L (ref 3.5–5.1)
PROT SERPL-MCNC: 7.8 G/DL (ref 6–8.4)
RBC #/AREA URNS AUTO: 5 /HPF (ref 0–4)
SODIUM SERPL-SCNC: 134 MMOL/L (ref 136–145)
SQUAMOUS #/AREA URNS AUTO: 10 /HPF
WBC #/AREA URNS AUTO: 5 /HPF (ref 0–5)

## 2023-03-22 PROCEDURE — 99999 PR PBB SHADOW E&M-EST. PATIENT-LVL IV: CPT | Mod: PBBFAC,,, | Performed by: INTERNAL MEDICINE

## 2023-03-22 PROCEDURE — 99999 PR PBB SHADOW E&M-EST. PATIENT-LVL IV: ICD-10-PCS | Mod: PBBFAC,,, | Performed by: INTERNAL MEDICINE

## 2023-03-22 PROCEDURE — 63600175 PHARM REV CODE 636 W HCPCS: Mod: ER | Performed by: EMERGENCY MEDICINE

## 2023-03-22 PROCEDURE — 99204 PR OFFICE/OUTPT VISIT, NEW, LEVL IV, 45-59 MIN: ICD-10-PCS | Mod: S$GLB,,, | Performed by: INTERNAL MEDICINE

## 2023-03-22 PROCEDURE — 99204 OFFICE O/P NEW MOD 45 MIN: CPT | Mod: S$GLB,,, | Performed by: INTERNAL MEDICINE

## 2023-03-22 PROCEDURE — 25000003 PHARM REV CODE 250: Mod: ER | Performed by: EMERGENCY MEDICINE

## 2023-03-22 RX ORDER — KETOROLAC TROMETHAMINE 10 MG/1
10 TABLET, FILM COATED ORAL EVERY 6 HOURS
Qty: 20 TABLET | Refills: 0 | Status: SHIPPED | OUTPATIENT
Start: 2023-03-22 | End: 2023-03-27

## 2023-03-22 RX ORDER — ALBUTEROL SULFATE 0.83 MG/ML
2.5 SOLUTION RESPIRATORY (INHALATION)
Qty: 360 ML | Refills: 11 | Status: SHIPPED | OUTPATIENT
Start: 2023-03-22 | End: 2023-10-11

## 2023-03-22 RX ORDER — ONDANSETRON 4 MG/1
4 TABLET, FILM COATED ORAL EVERY 8 HOURS PRN
Qty: 12 TABLET | Refills: 0 | Status: SHIPPED | OUTPATIENT
Start: 2023-03-22 | End: 2023-10-11

## 2023-03-22 RX ORDER — LEVOFLOXACIN 500 MG/1
500 TABLET, FILM COATED ORAL DAILY
Qty: 10 TABLET | Refills: 0 | Status: SHIPPED | OUTPATIENT
Start: 2023-03-22 | End: 2023-04-01

## 2023-03-22 RX ORDER — KETOROLAC TROMETHAMINE 30 MG/ML
10 INJECTION, SOLUTION INTRAMUSCULAR; INTRAVENOUS
Status: COMPLETED | OUTPATIENT
Start: 2023-03-22 | End: 2023-03-22

## 2023-03-22 RX ORDER — LEVOFLOXACIN 750 MG/1
750 TABLET ORAL
Status: COMPLETED | OUTPATIENT
Start: 2023-03-22 | End: 2023-03-22

## 2023-03-22 RX ADMIN — LEVOFLOXACIN 750 MG: 750 TABLET, FILM COATED ORAL at 03:03

## 2023-03-22 RX ADMIN — KETOROLAC TROMETHAMINE 10 MG: 30 INJECTION, SOLUTION INTRAMUSCULAR; INTRAVENOUS at 02:03

## 2023-03-22 NOTE — ED PROVIDER NOTES
Encounter Date: 3/21/2023       History     Chief Complaint   Patient presents with    Vomiting     The history is provided by the patient.   Emesis   This is a new problem. The current episode started two days ago. The problem occurs daily. The problem has been unchanged. The emesis has an appearance of stomach contents. Associated symptoms include cough, myalgias and URI. Pertinent negatives include no abdominal pain, no arthralgias, no chills, no diarrhea, no fever, no headaches and no sweats. Associated symptoms comments: Left flank pain.   Review of patient's allergies indicates:   Allergen Reactions    Penicillins      Past Medical History:   Diagnosis Date    Acquired scoliosis     Gastroesophageal reflux disease     Herpes simplex virus (HSV) infection      No past surgical history on file.  Family History   Problem Relation Age of Onset    Breast cancer Mother     Cancer Mother     Heart disease Mother     Hypertension Mother     Cerebral aneurysm Mother     Hypertension Maternal Grandmother     Heart disease Maternal Grandmother     Hypertension Maternal Grandfather     Heart disease Maternal Grandfather     Diabetes Neg Hx      Social History     Tobacco Use    Smoking status: Never    Smokeless tobacco: Never   Substance Use Topics    Alcohol use: Yes     Comment: occ    Drug use: No     Review of Systems   Constitutional:  Negative for chills and fever.   HENT:  Positive for congestion.    Respiratory:  Positive for cough.    Cardiovascular:  Negative for chest pain.   Gastrointestinal:  Positive for vomiting. Negative for abdominal pain and diarrhea.   Genitourinary:  Positive for flank pain.   Musculoskeletal:  Positive for myalgias. Negative for arthralgias.   Neurological:  Negative for headaches.     Physical Exam     Initial Vitals [03/21/23 2314]   BP Pulse Resp Temp SpO2   139/75 90 16 99.2 °F (37.3 °C) 98 %      MAP       --         Physical Exam    Nursing note and vitals  reviewed.  Constitutional: She appears well-developed and well-nourished. No distress.   HENT:   Head: Normocephalic and atraumatic.   Mouth/Throat: Oropharynx is clear and moist.   Eyes: Conjunctivae and EOM are normal. Pupils are equal, round, and reactive to light.   Neck: Neck supple.   Normal range of motion.  Cardiovascular:  Normal rate, regular rhythm and normal heart sounds.           Pulmonary/Chest: Breath sounds normal. No respiratory distress.   Abdominal: Abdomen is soft. Bowel sounds are normal. She exhibits no distension. There is no abdominal tenderness.   Musculoskeletal:         General: Normal range of motion.      Cervical back: Normal range of motion and neck supple.     Neurological: She is alert and oriented to person, place, and time. She has normal strength.   Skin: Skin is warm and dry.   Psychiatric: She has a normal mood and affect. Thought content normal.       ED Course   Procedures  Labs Reviewed   CBC W/ AUTO DIFFERENTIAL - Abnormal; Notable for the following components:       Result Value    Hematocrit 36.6 (*)     MPV 8.9 (*)     Gran # (ANC) 9.9 (*)     Immature Grans (Abs) 0.06 (*)     Gran % 79.8 (*)     Lymph % 13.8 (*)     All other components within normal limits   COMPREHENSIVE METABOLIC PANEL - Abnormal; Notable for the following components:    Sodium 134 (*)     CO2 22 (*)     BUN 5 (*)     All other components within normal limits   URINALYSIS, REFLEX TO URINE CULTURE - Abnormal; Notable for the following components:    Appearance, UA Cloudy (*)     Occult Blood UA 1+ (*)     Leukocytes, UA Trace (*)     All other components within normal limits    Narrative:     Specimen Source->Urine   URINALYSIS MICROSCOPIC - Abnormal; Notable for the following components:    RBC, UA 5 (*)     Bacteria Few (*)     All other components within normal limits    Narrative:     Specimen Source->Urine   LIPASE   PREGNANCY TEST, URINE RAPID    Narrative:     Specimen Source->Urine   POCT  INFLUENZA A/B MOLECULAR   SARS-COV-2 RDRP GENE     Results for orders placed or performed during the hospital encounter of 03/21/23   CBC W/ AUTO DIFFERENTIAL   Result Value Ref Range    WBC 12.33 3.90 - 12.70 K/uL    RBC 4.49 4.00 - 5.40 M/uL    Hemoglobin 13.0 12.0 - 16.0 g/dL    Hematocrit 36.6 (L) 37.0 - 48.5 %    MCV 82 82 - 98 fL    MCH 29.0 27.0 - 31.0 pg    MCHC 35.5 32.0 - 36.0 g/dL    RDW 12.1 11.5 - 14.5 %    Platelets 449 150 - 450 K/uL    MPV 8.9 (L) 9.2 - 12.9 fL    Immature Granulocytes 0.5 0.0 - 0.5 %    Gran # (ANC) 9.9 (H) 1.8 - 7.7 K/uL    Immature Grans (Abs) 0.06 (H) 0.00 - 0.04 K/uL    Lymph # 1.7 1.0 - 4.8 K/uL    Mono # 0.7 0.3 - 1.0 K/uL    Eos # 0.0 0.0 - 0.5 K/uL    Baso # 0.04 0.00 - 0.20 K/uL    nRBC 0 0 /100 WBC    Gran % 79.8 (H) 38.0 - 73.0 %    Lymph % 13.8 (L) 18.0 - 48.0 %    Mono % 5.4 4.0 - 15.0 %    Eosinophil % 0.2 0.0 - 8.0 %    Basophil % 0.3 0.0 - 1.9 %    Differential Method Automated    Comp. Metabolic Panel   Result Value Ref Range    Sodium 134 (L) 136 - 145 mmol/L    Potassium 3.8 3.5 - 5.1 mmol/L    Chloride 99 95 - 110 mmol/L    CO2 22 (L) 23 - 29 mmol/L    Glucose 110 70 - 110 mg/dL    BUN 5 (L) 6 - 20 mg/dL    Creatinine 0.7 0.5 - 1.4 mg/dL    Calcium 9.5 8.7 - 10.5 mg/dL    Total Protein 7.8 6.0 - 8.4 g/dL    Albumin 3.8 3.5 - 5.2 g/dL    Total Bilirubin 0.6 0.1 - 1.0 mg/dL    Alkaline Phosphatase 74 55 - 135 U/L    AST 12 10 - 40 U/L    ALT 10 10 - 44 U/L    Anion Gap 13 8 - 16 mmol/L    eGFR >60.0 >60 mL/min/1.73 m^2   Lipase   Result Value Ref Range    Lipase 12 4 - 60 U/L   Urinalysis, Reflex to Urine Culture Urine, Clean Catch    Specimen: Urine   Result Value Ref Range    Specimen UA Urine, Clean Catch     Color, UA Yellow Yellow, Straw, Manasa    Appearance, UA Cloudy (A) Clear    pH, UA 7.0 5.0 - 8.0    Specific Gravity, UA 1.010 1.005 - 1.030    Protein, UA Negative Negative    Glucose, UA Negative Negative    Ketones, UA Negative Negative    Bilirubin (UA)  Negative Negative    Occult Blood UA 1+ (A) Negative    Nitrite, UA Negative Negative    Urobilinogen, UA Negative <2.0 EU/dL    Leukocytes, UA Trace (A) Negative   Pregnancy, urine rapid   Result Value Ref Range    Preg Test, Ur Negative    Urinalysis Microscopic   Result Value Ref Range    RBC, UA 5 (H) 0 - 4 /hpf    WBC, UA 5 0 - 5 /hpf    Bacteria Few (A) None-Occ /hpf    Squam Epithel, UA 10 /hpf    Microscopic Comment SEE COMMENT    POCT Influenza A/B Molecular   Result Value Ref Range    POC Molecular Influenza A Ag Negative Negative, Not Reported    POC Molecular Influenza B Ag Negative Negative, Not Reported     Acceptable Yes    POCT COVID-19 Rapid Screening   Result Value Ref Range    POC Rapid COVID Negative Negative     Acceptable Yes             Imaging Results              CT Renal Stone Study ABD Pelvis WO (Preliminary result)  Result time 03/22/23 03:05:01      Wet Read by Jarrett Glez MD (03/22/23 03:05:01, [facility] Emergency Dept, Emergency Medicine)    No acute intra-abdominal findings                                     X-Ray Chest 1 View (Preliminary result)  Result time 03/22/23 00:26:32      Wet Read by Jarrett Glez MD (03/22/23 00:26:32, [facility] Emergency Dept, Emergency Medicine)    No acute findings                                3:11 AM - Counseling: Spoke with the patient and discussed todays findings, in addition to providing specific details for the plan of care and counseling regarding the diagnosis and prognosis. Questions are answered at this time.       Medications   levoFLOXacin tablet 750 mg (has no administration in time range)   sodium chloride 0.9% bolus 1,000 mL 1,000 mL (0 mLs Intravenous Stopped 3/22/23 0036)   ondansetron injection 4 mg (4 mg Intravenous Given 3/21/23 2336)   ketorolac injection 9.999 mg (9.999 mg Intravenous Given 3/22/23 0232)     Medical Decision Making:   Initial Assessment:   48 yo female  with Vomiting Flank Pain Cough  Differential Diagnosis:   Pneumonia, UTI, Pyelonephritis, KELBY, Gastroenteritis  Independently Interpreted Test(s):   I have ordered and independently interpreted X-rays - see prior notes.  I have ordered and independently interpreted EKG Reading(s) - see prior notes  Clinical Tests:   Lab Tests: Ordered and Reviewed  The following lab test(s) were unremarkable: CBC, CMP and Urinalysis  Radiological Study: Ordered and Reviewed  Medical Tests: Ordered and Reviewed  ED Management:  This patient with nausea and vomiting which is likely secondary to benign infectious cause. Considered but low risk for SBO (normal BM, passing flatus, no abdominal surgeries), no signs of DKA in labs. Patient BMP with normal electrolytes and no sign of dehydration causing prerenal KELBY. Low suspicion for gastric or esophageal dysmotility as cause. Patient with no chest pain,  so low suspicion for ACS. Based on history, exam, and work up low suspicion for pancreatitis, appendicitis, biliary pathology, or other emergent problem. Patient given zofran and tolerated PO here. Patient to be discharged with zofran and to follow up with PMD for evaluation of small right pleural effusion. Pt with flank pain and bacteruria will cover with Levaquin po antibiotic.                        Clinical Impression:   Final diagnoses:  [R05.9] Cough  [N39.0, R31.9] Urinary tract infection with hematuria, site unspecified (Primary)  [J90] Pleural effusion, bilateral  [R11.2] Nausea and vomiting, unspecified vomiting type        ED Disposition Condition    Discharge Stable          ED Prescriptions       Medication Sig Dispense Start Date End Date Auth. Provider    levoFLOXacin (LEVAQUIN) 500 MG tablet Take 1 tablet (500 mg total) by mouth once daily. for 10 days 10 tablet 3/22/2023 4/1/2023 Jarrett Glez MD    ondansetron (ZOFRAN) 4 MG tablet Take 1 tablet (4 mg total) by mouth every 8 (eight) hours as needed. 12 tablet  3/22/2023 -- Jarrett Glez MD          Follow-up Information       Follow up With Specialties Details Why Contact Info    Martine Nicolas MD Internal Medicine Call in 2 days  [address] [facility] [address] [phone]      [facility] Emergency Dept Emergency Medicine  If symptoms worsen [address] [address] [id number] [phone]    Pulmonary   evaluation of pleural effusions              Jarrett Glez MD  03/22/23 6855

## 2023-03-22 NOTE — PROGRESS NOTES
Subjective:     Patient ID: Kathia Arreola is a 47 y.o. female.    Chief Complaint:      HPI    46 y/o nonsmoker, grandson with runny nose  Started 5 days - sore throat, unable to swallow - seen in Urgent care - only given cortisone and cough medication   On 3/21 seen in Emergency Room with nausea and vominting  Fever for one day   Coughing and sputum production   Given levaqin and toradol  Zophran  No pleurisy  Sore throat - given cortisone  She   presents for evaluation and treatment of pleural effusion  after being discharged from the hospital  1  day ago. Since discharge symptoms have unchanged course since that time. Patient denies pleurtisy. Symptoms are aggravated by any activiry. Symptoms improve withrest.  Respiratory: positive for cough and dyspnea on exertion; Cardiovascular: no chest pain or palpitations.  Patient currently is not on home oxygen therapy..    MEDICAL RECORDS FROM THE HOSPITAL REVIEWED:        Past Medical History:   Diagnosis Date    Acquired scoliosis     Gastroesophageal reflux disease     Herpes simplex virus (HSV) infection      No past surgical history on file.  Review of patient's allergies indicates:   Allergen Reactions    Penicillins      Current Outpatient Medications on File Prior to Visit   Medication Sig Dispense Refill    chlorhexidine (PERIDEX) 0.12 % solution SMARTSIG:15 By Mouth Twice Daily      fluticasone propionate (FLONASE) 50 mcg/actuation nasal spray 1 spray (50 mcg total) by Each Nostril route once daily. 24 mL 11    levoFLOXacin (LEVAQUIN) 500 MG tablet Take 1 tablet (500 mg total) by mouth once daily. for 10 days 10 tablet 0    linaCLOtide (LINZESS) 145 mcg Cap capsule Take 1 capsule (145 mcg total) by mouth before breakfast. 30 capsule 3    montelukast (SINGULAIR) 10 mg tablet Take 10 mg by mouth once daily.      ondansetron (ZOFRAN) 4 MG tablet Take 1 tablet (4 mg total) by mouth every 8 (eight) hours as needed. 12 tablet 0    valACYclovir (VALTREX) 500 MG  tablet Take 500 mg by mouth 2 (two) times daily.      [DISCONTINUED] ibuprofen (ADVIL,MOTRIN) 800 MG tablet Take 800 mg by mouth every 8 (eight) hours as needed.       Current Facility-Administered Medications on File Prior to Visit   Medication Dose Route Frequency Provider Last Rate Last Admin    [COMPLETED] ketorolac injection 9.999 mg  9.999 mg Intravenous ED 1 Time Jarrett Glez MD   9.999 mg at 03/22/23 0232    [COMPLETED] levoFLOXacin tablet 750 mg  750 mg Oral ED 1 Time Jarrett Glez MD   750 mg at 03/22/23 0317    [COMPLETED] ondansetron injection 4 mg  4 mg Intravenous ED 1 Time Jarrett Glez MD   4 mg at 03/21/23 2336    [COMPLETED] sodium chloride 0.9% bolus 1,000 mL 1,000 mL  1,000 mL Intravenous Once Jarrett Glez MD   Stopped at 03/22/23 0036     Social History     Socioeconomic History    Marital status: Single   Tobacco Use    Smoking status: Never    Smokeless tobacco: Never   Substance and Sexual Activity    Alcohol use: Yes     Comment: occ    Drug use: No    Sexual activity: Not Currently     Partners: Male     Birth control/protection: Condom     Family History   Problem Relation Age of Onset    Breast cancer Mother     Cancer Mother     Heart disease Mother     Hypertension Mother     Cerebral aneurysm Mother     Hypertension Maternal Grandmother     Heart disease Maternal Grandmother     Hypertension Maternal Grandfather     Heart disease Maternal Grandfather     Diabetes Neg Hx        Review of Systems   Constitutional:  Positive for fatigue. Negative for fever.   HENT:  Positive for postnasal drip, rhinorrhea and congestion.    Eyes:  Negative for redness and itching.   Respiratory:  Positive for cough, sputum production, shortness of breath, dyspnea on extertion, use of rescue inhaler and Paroxysmal Nocturnal Dyspnea.    Cardiovascular:  Negative for chest pain, palpitations and leg swelling.   Genitourinary:  Negative for difficulty urinating  "and hematuria.   Endocrine:  Negative for cold intolerance and heat intolerance.    Musculoskeletal:  Positive for arthralgias.   Skin:  Negative for rash.   Gastrointestinal:  Positive for nausea. Negative for abdominal pain.   Neurological:  Negative for dizziness, syncope, weakness and light-headedness.   Hematological:  Negative for adenopathy. Does not bruise/bleed easily.   Psychiatric/Behavioral:  Negative for sleep disturbance. The patient is not nervous/anxious.      Objective:      BP (!) 142/92   Pulse 85   Resp 18   Ht 5' 3" (1.6 m)   Wt 76.9 kg (169 lb 6.8 oz)   SpO2 98%   BMI 30.01 kg/m²   Physical Exam  Vitals and nursing note reviewed.   Constitutional:       Appearance: She is well-developed.   HENT:      Head: Normocephalic and atraumatic.      Nose: Nose normal.      Mouth/Throat:      Pharynx: No oropharyngeal exudate.   Eyes:      Conjunctiva/sclera: Conjunctivae normal.      Pupils: Pupils are equal, round, and reactive to light.   Neck:      Thyroid: No thyromegaly.      Vascular: No JVD.      Trachea: No tracheal deviation.   Cardiovascular:      Rate and Rhythm: Normal rate and regular rhythm.      Heart sounds: Normal heart sounds.   Pulmonary:      Effort: Pulmonary effort is normal. No respiratory distress.      Breath sounds: Decreased breath sounds and rales present. No rhonchi.   Chest:      Chest wall: No tenderness.   Abdominal:      General: Bowel sounds are normal.      Palpations: Abdomen is soft.   Musculoskeletal:         General: Normal range of motion.      Cervical back: Neck supple.   Lymphadenopathy:      Cervical: No cervical adenopathy.   Skin:     General: Skin is warm and dry.   Neurological:      Mental Status: She is alert and oriented to person, place, and time.     Personal Diagnostic Review  Chest x-ray: right lower lobe infiltrate ?    Pulmonary Studies Review 3/22/2023   SpO2 98   Height 63   Weight 2710.78   BMI (Calculated) 30   Predicted Distance 428.45 "   Predicted Distance Meters (Calculated) 568.51       X-Ray Chest 1 View  Narrative: EXAMINATION:  XR CHEST 1 VIEW    CLINICAL HISTORY:  Cough, unspecified    FINDINGS:  Comparison: None available.    Mediastinal silhouette is within normal limits.  The lungs are clear.  No pneumothorax or pleural effusion.  No acute osseous finding.  Impression: No acute finding in the chest.    Electronically signed by: Gustavo Escobar  Date:    03/22/2023  Time:    07:41  CT Renal Stone Study ABD Pelvis WO  Narrative: EXAMINATION:  CT RENAL STONE STUDY ABD PELVIS WO    CLINICAL HISTORY:  Flank pain, kidney stone suspected;    TECHNIQUE:  Low dose axial images, sagittal and coronal reformations were obtained from the lung bases to the pubic symphysis..    Axial CT images were obtained of the abdomen and pelvis.  Iterative reconstruction technique was used. The CT exam was performed using one or more of the following dose reduction techniques- Automated exposure control, adjustment of the mA and/or kV according to patient size, and/or use of iterative reconstructed technique.    COMPARISON:  None    FINDINGS:  Heart: Normal in size. No pericardial effusion.    Lung Bases: Trace bilateral pleural effusions.    Liver: Normal in size and attenuation, with no focal hepatic lesions.    Gallbladder: No calcified gallstones.    Bile Ducts: No evidence of dilated ducts.    Pancreas: No mass or peripancreatic fat stranding.    Spleen: Unremarkable.    Adrenals: Unremarkable.    Kidneys/ Ureters: Unremarkable.    Bladder: No evidence of wall thickening.    Reproductive organs: Unremarkable.    GI Tract/Mesentery: There is some wall thickening involving the antrum of the stomach.  No evidence of bowel obstruction.    Peritoneal Space: No ascites. No free air.    Retroperitoneum: No significant adenopathy.    Abdominal wall: Unremarkable.    Vasculature: No significant atherosclerosis or aneurysm.    Bones: No acute fracture.  Impression: 1. No  definite acute finding in the abdomen or pelvis.  2. There is some wall thickening in the antrum of the stomach which may reflect gastritis or may be physiologic.  3. Trace bilateral pleural effusions.    Electronically signed by: Gustavo Escobar  Date:    03/22/2023  Time:    07:18      Office Spirometry Results:     No flowsheet data found.    Pulmonary Studies Review 3/22/2023   SpO2 98   Height 63   Weight 2710.78   BMI (Calculated) 30   Predicted Distance 428.45   Predicted Distance Meters (Calculated) 568.51         Assessment:            Bronchitis  -     albuterol (PROVENTIL) 2.5 mg /3 mL (0.083 %) nebulizer solution; Take 3 mLs (2.5 mg total) by nebulization every 4 to 6 hours as needed for Wheezing or Shortness of Breath.  Dispense: 360 mL; Refill: 11  -     NEBULIZER KIT (SUPPLIES) FOR HOME USE  -     NEBULIZER FOR HOME USE  -     X-Ray Chest 4 Or More View; Future; Expected date: 03/22/2023    History of left flank pain  -     ketorolac (TORADOL) 10 mg tablet; Take 1 tablet (10 mg total) by mouth every 6 (six) hours. for 5 days  Dispense: 20 tablet; Refill: 0          Outpatient Encounter Medications as of 3/22/2023   Medication Sig Dispense Refill    chlorhexidine (PERIDEX) 0.12 % solution SMARTSIG:15 By Mouth Twice Daily      fluticasone propionate (FLONASE) 50 mcg/actuation nasal spray 1 spray (50 mcg total) by Each Nostril route once daily. 24 mL 11    levoFLOXacin (LEVAQUIN) 500 MG tablet Take 1 tablet (500 mg total) by mouth once daily. for 10 days 10 tablet 0    linaCLOtide (LINZESS) 145 mcg Cap capsule Take 1 capsule (145 mcg total) by mouth before breakfast. 30 capsule 3    montelukast (SINGULAIR) 10 mg tablet Take 10 mg by mouth once daily.      ondansetron (ZOFRAN) 4 MG tablet Take 1 tablet (4 mg total) by mouth every 8 (eight) hours as needed. 12 tablet 0    valACYclovir (VALTREX) 500 MG tablet Take 500 mg by mouth 2 (two) times daily.      [DISCONTINUED] ibuprofen (ADVIL,MOTRIN) 800 MG tablet  Take 800 mg by mouth every 8 (eight) hours as needed.      albuterol (PROVENTIL) 2.5 mg /3 mL (0.083 %) nebulizer solution Take 3 mLs (2.5 mg total) by nebulization every 4 to 6 hours as needed for Wheezing or Shortness of Breath. 360 mL 11    ketorolac (TORADOL) 10 mg tablet Take 1 tablet (10 mg total) by mouth every 6 (six) hours. for 5 days 20 tablet 0     Facility-Administered Encounter Medications as of 3/22/2023   Medication Dose Route Frequency Provider Last Rate Last Admin    [COMPLETED] ketorolac injection 9.999 mg  9.999 mg Intravenous ED 1 Time Jarrett Glez MD   9.999 mg at 03/22/23 0232    [COMPLETED] levoFLOXacin tablet 750 mg  750 mg Oral ED 1 Time Jarrett Glez MD   750 mg at 03/22/23 0317    [COMPLETED] sodium chloride 0.9% bolus 1,000 mL 1,000 mL  1,000 mL Intravenous Once Jarrett Glez MD   Stopped at 03/22/23 0036     Plan:       Requested Prescriptions     Signed Prescriptions Disp Refills    ketorolac (TORADOL) 10 mg tablet 20 tablet 0     Sig: Take 1 tablet (10 mg total) by mouth every 6 (six) hours. for 5 days    albuterol (PROVENTIL) 2.5 mg /3 mL (0.083 %) nebulizer solution 360 mL 11     Sig: Take 3 mLs (2.5 mg total) by nebulization every 4 to 6 hours as needed for Wheezing or Shortness of Breath.     Problem List Items Addressed This Visit    None  Visit Diagnoses       Bronchitis    -  Primary    Relevant Medications    albuterol (PROVENTIL) 2.5 mg /3 mL (0.083 %) nebulizer solution    Other Relevant Orders    NEBULIZER KIT (SUPPLIES) FOR HOME USE    NEBULIZER FOR HOME USE    X-Ray Chest 4 Or More View    History of left flank pain        Relevant Medications    ketorolac (TORADOL) 10 mg tablet               Follow up in about 3 weeks (around 4/12/2023) for Review CXR - on return.    MEDICAL DECISION MAKING: Moderate to high complexity.  Overall, the multiple problems listed are of moderate to high severity that may impact quality of life and activities  of daily living. Side effects of medications, treatment plan as well as options and alternatives reviewed and discussed with patient. There was counseling of patient concerning these issues.    Total time spent in counseling and coordination of care - 45  minutes of total time spent on the encounter, which includes face to face time and non-face to face time preparing to see the patient (eg, review of tests), Obtaining and/or reviewing separately obtained history, Documenting clinical information in the electronic or other health record, Independently interpreting results (not separately reported) and communicating results to the patient/family/caregiver, or Care coordination (not separately reported).    Time was used in discussion of prognosis, risks, benefits of treatment, instructions and compliance with regimen . Discussion with other physicians and/or health care providers - home health or for use of durable medical equipment (oxygen, nebulizers, CPAP, BiPAP) occurred.

## 2023-03-29 ENCOUNTER — OFFICE VISIT (OUTPATIENT)
Dept: INTERNAL MEDICINE | Facility: CLINIC | Age: 48
End: 2023-03-29
Payer: COMMERCIAL

## 2023-03-29 VITALS
HEIGHT: 63 IN | DIASTOLIC BLOOD PRESSURE: 62 MMHG | TEMPERATURE: 97 F | HEART RATE: 103 BPM | WEIGHT: 172.19 LBS | RESPIRATION RATE: 18 BRPM | BODY MASS INDEX: 30.51 KG/M2 | OXYGEN SATURATION: 99 % | SYSTOLIC BLOOD PRESSURE: 108 MMHG

## 2023-03-29 DIAGNOSIS — R52 BODY ACHES: Primary | ICD-10-CM

## 2023-03-29 PROCEDURE — 99999 PR PBB SHADOW E&M-EST. PATIENT-LVL IV: CPT | Mod: PBBFAC,,, | Performed by: FAMILY MEDICINE

## 2023-03-29 PROCEDURE — 99999 PR PBB SHADOW E&M-EST. PATIENT-LVL IV: ICD-10-PCS | Mod: PBBFAC,,, | Performed by: FAMILY MEDICINE

## 2023-03-29 PROCEDURE — 99213 PR OFFICE/OUTPT VISIT, EST, LEVL III, 20-29 MIN: ICD-10-PCS | Mod: S$GLB,,, | Performed by: FAMILY MEDICINE

## 2023-03-29 PROCEDURE — 99213 OFFICE O/P EST LOW 20 MIN: CPT | Mod: S$GLB,,, | Performed by: FAMILY MEDICINE

## 2023-03-29 RX ORDER — FUROSEMIDE 20 MG/1
20 TABLET ORAL DAILY
Qty: 5 TABLET | Refills: 0 | Status: SHIPPED | OUTPATIENT
Start: 2023-03-29 | End: 2023-10-11

## 2023-03-29 RX ORDER — IBUPROFEN 800 MG/1
800 TABLET ORAL 3 TIMES DAILY PRN
Qty: 30 TABLET | Refills: 0 | Status: SHIPPED | OUTPATIENT
Start: 2023-03-29

## 2023-03-29 NOTE — PROGRESS NOTES
Subjective:       Patient ID: Kathia Arreola is a 47 y.o. female.    Chief Complaint: URI    HPI Ms Arreola presents today for follow up after bronchitis.   Symptoms started on the 18th   Went to  and got a shot     Started feeling a little better     Next day or so started feeling bad again  Levaquin given when she went to the ER 3/21  She had a UTI     Went to pulmonary on the next day   Breathing treatment   She is using a nebulizer   States this is getting better    Body aching and fatigued     Alahist dried her       Review of Systems   Constitutional:  Negative for activity change, appetite change, fatigue and fever.   HENT:  Negative for congestion, ear pain and sinus pressure.    Eyes:  Negative for pain and visual disturbance.   Respiratory:  Positive for cough. Negative for chest tightness and wheezing.    Cardiovascular:  Negative for chest pain, palpitations and leg swelling.   Gastrointestinal:  Negative for abdominal distention, abdominal pain, constipation, diarrhea, nausea and vomiting.   Endocrine: Negative for polydipsia and polyuria.   Genitourinary:  Negative for difficulty urinating, dyspareunia, dysuria, flank pain and hematuria.   Musculoskeletal:  Positive for arthralgias. Negative for back pain.   Skin:  Negative for rash.   Neurological:  Negative for dizziness, tremors, syncope, weakness, numbness and headaches.   Psychiatric/Behavioral:  Negative for agitation and confusion.          Past Medical History:   Diagnosis Date    Acquired scoliosis     Gastroesophageal reflux disease     Herpes simplex virus (HSV) infection      No past surgical history on file.  Family History   Problem Relation Age of Onset    Breast cancer Mother     Cancer Mother     Heart disease Mother     Hypertension Mother     Cerebral aneurysm Mother     Hypertension Maternal Grandmother     Heart disease Maternal Grandmother     Hypertension Maternal Grandfather     Heart disease Maternal Grandfather     Diabetes  Neg Hx      Social History     Socioeconomic History    Marital status: Single   Tobacco Use    Smoking status: Never    Smokeless tobacco: Never   Substance and Sexual Activity    Alcohol use: Yes     Comment: occ    Drug use: No    Sexual activity: Not Currently     Partners: Male     Birth control/protection: Condom       Objective:        Physical Exam  Vitals reviewed.   Constitutional:       Appearance: Normal appearance.   HENT:      Head: Normocephalic and atraumatic.      Right Ear: External ear normal.      Left Ear: External ear normal.   Skin:     General: Skin is warm.   Neurological:      Mental Status: She is alert and oriented to person, place, and time.         Results for orders placed or performed during the hospital encounter of 03/21/23   CBC W/ AUTO DIFFERENTIAL   Result Value Ref Range    WBC 12.33 3.90 - 12.70 K/uL    RBC 4.49 4.00 - 5.40 M/uL    Hemoglobin 13.0 12.0 - 16.0 g/dL    Hematocrit 36.6 (L) 37.0 - 48.5 %    MCV 82 82 - 98 fL    MCH 29.0 27.0 - 31.0 pg    MCHC 35.5 32.0 - 36.0 g/dL    RDW 12.1 11.5 - 14.5 %    Platelets 449 150 - 450 K/uL    MPV 8.9 (L) 9.2 - 12.9 fL    Immature Granulocytes 0.5 0.0 - 0.5 %    Gran # (ANC) 9.9 (H) 1.8 - 7.7 K/uL    Immature Grans (Abs) 0.06 (H) 0.00 - 0.04 K/uL    Lymph # 1.7 1.0 - 4.8 K/uL    Mono # 0.7 0.3 - 1.0 K/uL    Eos # 0.0 0.0 - 0.5 K/uL    Baso # 0.04 0.00 - 0.20 K/uL    nRBC 0 0 /100 WBC    Gran % 79.8 (H) 38.0 - 73.0 %    Lymph % 13.8 (L) 18.0 - 48.0 %    Mono % 5.4 4.0 - 15.0 %    Eosinophil % 0.2 0.0 - 8.0 %    Basophil % 0.3 0.0 - 1.9 %    Differential Method Automated    Comp. Metabolic Panel   Result Value Ref Range    Sodium 134 (L) 136 - 145 mmol/L    Potassium 3.8 3.5 - 5.1 mmol/L    Chloride 99 95 - 110 mmol/L    CO2 22 (L) 23 - 29 mmol/L    Glucose 110 70 - 110 mg/dL    BUN 5 (L) 6 - 20 mg/dL    Creatinine 0.7 0.5 - 1.4 mg/dL    Calcium 9.5 8.7 - 10.5 mg/dL    Total Protein 7.8 6.0 - 8.4 g/dL    Albumin 3.8 3.5 - 5.2 g/dL     Total Bilirubin 0.6 0.1 - 1.0 mg/dL    Alkaline Phosphatase 74 55 - 135 U/L    AST 12 10 - 40 U/L    ALT 10 10 - 44 U/L    Anion Gap 13 8 - 16 mmol/L    eGFR >60.0 >60 mL/min/1.73 m^2   Lipase   Result Value Ref Range    Lipase 12 4 - 60 U/L   Urinalysis, Reflex to Urine Culture Urine, Clean Catch    Specimen: Urine   Result Value Ref Range    Specimen UA Urine, Clean Catch     Color, UA Yellow Yellow, Straw, Manasa    Appearance, UA Cloudy (A) Clear    pH, UA 7.0 5.0 - 8.0    Specific Gravity, UA 1.010 1.005 - 1.030    Protein, UA Negative Negative    Glucose, UA Negative Negative    Ketones, UA Negative Negative    Bilirubin (UA) Negative Negative    Occult Blood UA 1+ (A) Negative    Nitrite, UA Negative Negative    Urobilinogen, UA Negative <2.0 EU/dL    Leukocytes, UA Trace (A) Negative   Pregnancy, urine rapid   Result Value Ref Range    Preg Test, Ur Negative    Urinalysis Microscopic   Result Value Ref Range    RBC, UA 5 (H) 0 - 4 /hpf    WBC, UA 5 0 - 5 /hpf    Bacteria Few (A) None-Occ /hpf    Squam Epithel, UA 10 /hpf    Microscopic Comment SEE COMMENT    POCT Influenza A/B Molecular   Result Value Ref Range    POC Molecular Influenza A Ag Negative Negative, Not Reported    POC Molecular Influenza B Ag Negative Negative, Not Reported     Acceptable Yes    POCT COVID-19 Rapid Screening   Result Value Ref Range    POC Rapid COVID Negative Negative     Acceptable Yes        Assessment/Plan:     Body aches  -     ibuprofen (ADVIL,MOTRIN) 800 MG tablet; Take 1 tablet (800 mg total) by mouth 3 (three) times daily as needed for Pain.  Dispense: 30 tablet; Refill: 0    Other orders  -     furosemide (LASIX) 20 MG tablet; Take 1 tablet (20 mg total) by mouth once daily.  Dispense: 5 tablet; Refill: 0        Follow up as needed     Luisa Abdi MD  [facility]   Family Medicine

## 2023-04-25 ENCOUNTER — TELEPHONE (OUTPATIENT)
Dept: OBSTETRICS AND GYNECOLOGY | Facility: CLINIC | Age: 48
End: 2023-04-25
Payer: COMMERCIAL

## 2023-04-25 DIAGNOSIS — Z12.31 BREAST CANCER SCREENING BY MAMMOGRAM: Primary | ICD-10-CM

## 2023-04-25 NOTE — TELEPHONE ENCOUNTER
----- Message from Fortino Thompson sent at 4/24/2023  3:47 PM CDT -----  Contact: self  Pt is asking for an return call in reference to having orders put in to have mammogram done, please call back at [phone] Thx CJ

## 2023-09-28 ENCOUNTER — HOSPITAL ENCOUNTER (OUTPATIENT)
Dept: RADIOLOGY | Facility: HOSPITAL | Age: 48
Discharge: HOME | End: 2023-09-28
Attending: OBSTETRICS & GYNECOLOGY
Payer: COMMERCIAL

## 2023-09-28 VITALS — WEIGHT: 172.19 LBS | HEIGHT: 63 IN | BODY MASS INDEX: 30.51 KG/M2

## 2023-09-28 DIAGNOSIS — R92.8 ABNORMAL MAMMOGRAM: ICD-10-CM

## 2023-09-28 DIAGNOSIS — Z12.31 BREAST CANCER SCREENING BY MAMMOGRAM: ICD-10-CM

## 2023-09-28 PROCEDURE — 76642 ULTRASOUND BREAST LIMITED: CPT | Mod: TC,RT

## 2023-09-28 PROCEDURE — 77062 BREAST TOMOSYNTHESIS BI: CPT | Mod: 26,,, | Performed by: RADIOLOGY

## 2023-09-28 PROCEDURE — 77066 MAMMO DIGITAL DIAGNOSTIC BILAT WITH TOMO: ICD-10-PCS | Mod: 26,,, | Performed by: RADIOLOGY

## 2023-09-28 PROCEDURE — 77062 MAMMO DIGITAL DIAGNOSTIC BILAT WITH TOMO: ICD-10-PCS | Mod: 26,,, | Performed by: RADIOLOGY

## 2023-09-28 PROCEDURE — 76642 US BREAST RIGHT LIMITED: ICD-10-PCS | Mod: 26,RT,, | Performed by: RADIOLOGY

## 2023-09-28 PROCEDURE — 77066 DX MAMMO INCL CAD BI: CPT | Mod: TC

## 2023-09-28 PROCEDURE — 76642 ULTRASOUND BREAST LIMITED: CPT | Mod: 26,RT,, | Performed by: RADIOLOGY

## 2023-09-28 PROCEDURE — 77066 DX MAMMO INCL CAD BI: CPT | Mod: 26,,, | Performed by: RADIOLOGY

## 2023-10-11 ENCOUNTER — OFFICE VISIT (OUTPATIENT)
Dept: OBSTETRICS AND GYNECOLOGY | Facility: CLINIC | Age: 48
End: 2023-10-11
Payer: COMMERCIAL

## 2023-10-11 ENCOUNTER — LAB VISIT (OUTPATIENT)
Dept: LAB | Facility: HOSPITAL | Age: 48
End: 2023-10-11
Attending: NURSE PRACTITIONER
Payer: COMMERCIAL

## 2023-10-11 VITALS
SYSTOLIC BLOOD PRESSURE: 116 MMHG | DIASTOLIC BLOOD PRESSURE: 78 MMHG | HEIGHT: 63 IN | BODY MASS INDEX: 30.97 KG/M2 | WEIGHT: 174.81 LBS

## 2023-10-11 DIAGNOSIS — N92.0 MENORRHAGIA WITH REGULAR CYCLE: ICD-10-CM

## 2023-10-11 DIAGNOSIS — A60.04 HERPES SIMPLEX VULVOVAGINITIS: ICD-10-CM

## 2023-10-11 DIAGNOSIS — Z12.11 SCREENING FOR COLON CANCER: ICD-10-CM

## 2023-10-11 DIAGNOSIS — Z01.419 ENCOUNTER FOR GYNECOLOGICAL EXAMINATION WITHOUT ABNORMAL FINDING: Primary | ICD-10-CM

## 2023-10-11 DIAGNOSIS — B97.7 HPV IN FEMALE: ICD-10-CM

## 2023-10-11 DIAGNOSIS — R53.83 FATIGUE, UNSPECIFIED TYPE: ICD-10-CM

## 2023-10-11 LAB
BASOPHILS # BLD AUTO: 0.05 K/UL (ref 0–0.2)
BASOPHILS NFR BLD: 0.8 % (ref 0–1.9)
DIFFERENTIAL METHOD: ABNORMAL
EOSINOPHIL # BLD AUTO: 0.1 K/UL (ref 0–0.5)
EOSINOPHIL NFR BLD: 0.8 % (ref 0–8)
ERYTHROCYTE [DISTWIDTH] IN BLOOD BY AUTOMATED COUNT: 13.6 % (ref 11.5–14.5)
FERRITIN SERPL-MCNC: 27 NG/ML (ref 20–300)
HCT VFR BLD AUTO: 36.3 % (ref 37–48.5)
HGB BLD-MCNC: 12.2 G/DL (ref 12–16)
IMM GRANULOCYTES # BLD AUTO: 0.01 K/UL (ref 0–0.04)
IMM GRANULOCYTES NFR BLD AUTO: 0.2 % (ref 0–0.5)
IRON SERPL-MCNC: 81 UG/DL (ref 30–160)
LYMPHOCYTES # BLD AUTO: 1.7 K/UL (ref 1–4.8)
LYMPHOCYTES NFR BLD: 27.4 % (ref 18–48)
MCH RBC QN AUTO: 27.9 PG (ref 27–31)
MCHC RBC AUTO-ENTMCNC: 33.6 G/DL (ref 32–36)
MCV RBC AUTO: 83 FL (ref 82–98)
MONOCYTES # BLD AUTO: 0.3 K/UL (ref 0.3–1)
MONOCYTES NFR BLD: 4.8 % (ref 4–15)
NEUTROPHILS # BLD AUTO: 4 K/UL (ref 1.8–7.7)
NEUTROPHILS NFR BLD: 66 % (ref 38–73)
NRBC BLD-RTO: 0 /100 WBC
PLATELET # BLD AUTO: 459 K/UL (ref 150–450)
PMV BLD AUTO: 9.7 FL (ref 9.2–12.9)
RBC # BLD AUTO: 4.38 M/UL (ref 4–5.4)
SATURATED IRON: 21 % (ref 20–50)
TOTAL IRON BINDING CAPACITY: 385 UG/DL (ref 250–450)
TRANSFERRIN SERPL-MCNC: 260 MG/DL (ref 200–375)
TSH SERPL DL<=0.005 MIU/L-ACNC: 1.51 UIU/ML (ref 0.4–4)
WBC # BLD AUTO: 6.03 K/UL (ref 3.9–12.7)

## 2023-10-11 PROCEDURE — 99396 PREV VISIT EST AGE 40-64: CPT | Mod: S$GLB,,, | Performed by: NURSE PRACTITIONER

## 2023-10-11 PROCEDURE — 99999 PR PBB SHADOW E&M-EST. PATIENT-LVL III: ICD-10-PCS | Mod: PBBFAC,,, | Performed by: NURSE PRACTITIONER

## 2023-10-11 PROCEDURE — 84466 ASSAY OF TRANSFERRIN: CPT | Performed by: NURSE PRACTITIONER

## 2023-10-11 PROCEDURE — 99396 PR PREVENTIVE VISIT,EST,40-64: ICD-10-PCS | Mod: S$GLB,,, | Performed by: NURSE PRACTITIONER

## 2023-10-11 PROCEDURE — 84443 ASSAY THYROID STIM HORMONE: CPT | Performed by: NURSE PRACTITIONER

## 2023-10-11 PROCEDURE — 36415 COLL VENOUS BLD VENIPUNCTURE: CPT | Mod: PN | Performed by: NURSE PRACTITIONER

## 2023-10-11 PROCEDURE — 88175 CYTOPATH C/V AUTO FLUID REDO: CPT | Performed by: NURSE PRACTITIONER

## 2023-10-11 PROCEDURE — 82728 ASSAY OF FERRITIN: CPT | Performed by: NURSE PRACTITIONER

## 2023-10-11 PROCEDURE — 99999 PR PBB SHADOW E&M-EST. PATIENT-LVL III: CPT | Mod: PBBFAC,,, | Performed by: NURSE PRACTITIONER

## 2023-10-11 PROCEDURE — 83540 ASSAY OF IRON: CPT | Performed by: NURSE PRACTITIONER

## 2023-10-11 PROCEDURE — 85025 COMPLETE CBC W/AUTO DIFF WBC: CPT | Performed by: NURSE PRACTITIONER

## 2023-10-11 PROCEDURE — 87624 HPV HI-RISK TYP POOLED RSLT: CPT | Performed by: NURSE PRACTITIONER

## 2023-10-11 RX ORDER — ACYCLOVIR 50 MG/G
OINTMENT TOPICAL
COMMUNITY
Start: 2023-06-19

## 2023-10-11 RX ORDER — LISDEXAMFETAMINE DIMESYLATE 20 MG/1
20 CAPSULE ORAL
COMMUNITY
Start: 2023-07-02 | End: 2024-02-26

## 2023-10-11 RX ORDER — VALACYCLOVIR HYDROCHLORIDE 1 G/1
1000 TABLET, FILM COATED ORAL DAILY
Qty: 90 TABLET | Refills: 3 | Status: SHIPPED | OUTPATIENT
Start: 2023-10-11 | End: 2024-10-11

## 2023-10-11 RX ORDER — KETOROLAC TROMETHAMINE 10 MG/1
TABLET, FILM COATED ORAL
COMMUNITY
Start: 2022-07-30

## 2023-10-11 NOTE — PROGRESS NOTES
Subjective:       Patient ID: Kathia Arreola is a 48 y.o. female.    Chief Complaint:  Well Woman (Heavy bleeding, cramping, pain on lower left abd)      History of Present Illness  HPI  Annual Exam-Premenopausal   presents for annual exam and problem. The patient is not currently sexually active in the last 2-3 years. GYN screening history: last pap: approximate date 2022 and was abnormal: NILM, + HPV and last mammogram: approximate date 2023 and was normal. Normal pap hx prior to this; has questions about hpv. The patient wears seatbelts: yes. The patient participates in regular exercise: no. Has the patient ever been transfused or tattooed?: no. The patient reports that there is not domestic violence in her life.    C/o worsening menses for the last three months.  Monthly; home the first two days.  Heavy with bad cramping; super tampon with overnight pad changing frequently; a lot of quarter sized clots.  Ibuprofen or hot showers a couple times a day with mild relief.  Interested in hysterectomy.  Fatigued; denies any other sx of anemia.    Feels burning pain like an outbreak frequently; feels valtrex does not help.  Tried suppressive therapy 500mg.      No bladder issues; constipation; not drinking much water    Works at [employer]    GYN & OB History  Patient's last menstrual period was 10/01/2023.   Date of Last Pap: 10/11/2023    OB History    Para Term  AB Living   2 2 2 0 0 2   SAB IAB Ectopic Multiple Live Births   0 0 0 0 2      # Outcome Date GA Lbr Rashid/2nd Weight Sex Delivery Anes PTL Lv   2 Term      Vag-Spont   ELVIE   1 Term      Vag-Spont   ELVIE       Review of Systems  Review of Systems   Constitutional:  Positive for fatigue. Negative for activity change, appetite change, chills and fever.        +night sweats   HENT:  Negative for nasal congestion and mouth sores.    Respiratory:  Negative for cough, shortness of breath and wheezing.    Cardiovascular:  Negative for chest  pain.   Gastrointestinal:  Positive for constipation. Negative for abdominal pain, diarrhea, nausea and vomiting.   Endocrine: Negative for hair loss and hot flashes.   Genitourinary:  Positive for dysmenorrhea and menorrhagia. Negative for bladder incontinence, decreased libido, dyspareunia, dysuria, frequency, genital sores, hematuria, hot flashes, menstrual problem, pelvic pain, urgency, vaginal discharge, vaginal pain, urinary incontinence, postcoital bleeding, postmenopausal bleeding, vaginal dryness and vaginal odor.   Musculoskeletal:  Negative for back pain.   Integumentary:  Negative for breast mass, nipple discharge, breast skin changes and breast tenderness.   Neurological:  Negative for headaches.   Hematological:  Negative for adenopathy.   Psychiatric/Behavioral:  Negative for depression and sleep disturbance. The patient is not nervous/anxious.    All other systems reviewed and are negative.  Breast: Negative for breast self exam, lump, mass, mastodynia, nipple discharge, skin changes and tenderness          Objective:      Physical Exam:   Constitutional: She is oriented to person, place, and time. She appears well-developed and well-nourished. No distress.    HENT:   Head: Normocephalic and atraumatic.   Nose: Nose normal.    Eyes: Pupils are equal, round, and reactive to light. Conjunctivae and EOM are normal. Right eye exhibits no discharge. Left eye exhibits no discharge.     Cardiovascular:  Normal rate, regular rhythm and normal heart sounds.      Exam reveals no gallop, no friction rub, no clubbing, no cyanosis and no edema.       No murmur heard.   Pulmonary/Chest: Effort normal and breath sounds normal. No respiratory distress. She has no decreased breath sounds. She has no wheezes. She has no rhonchi. She has no rales.   Breast exam deferred per pt        Abdominal: Soft. Bowel sounds are normal. She exhibits no distension. There is no abdominal tenderness. There is no rebound and no  guarding. Hernia confirmed negative in the right inguinal area and confirmed negative in the left inguinal area.     Genitourinary:    Inguinal canal, vagina, uterus, right adnexa and left adnexa normal.   Rectum:      No external hemorrhoid.      Pelvic exam was performed with patient in the lithotomy position.   The external female genitalia was normal.   No external genitalia lesions identified,Genitalia hair distrobution normal .   Labial bartholins normal.There is no rash, tenderness, lesion or injury on the right labia. There is no rash, tenderness, lesion or injury on the left labia. Cervix is normal. Right adnexum displays no mass, no tenderness and no fullness. Left adnexum displays no mass, no tenderness and no fullness. No erythema,  no vaginal discharge, tenderness or bleeding in the vagina.    No foreign body in the vagina.      No signs of injury in the vagina.   Vagina was moist.Cervix exhibits no motion tenderness, no lesion, no discharge, no friability, no lesion, no tenderness and no polyp.    pap smear completedUerus contour normal  Uterus is not enlarged and not tender. Uterus size: 10 cm.Normal urethral meatus.Urethral Meatus exhibits: urethral lesion and prolapsedUrethra findings: no urethral mass, no tenderness and no urethral scarringBladder findings: no bladder distention and no bladder tenderness          Musculoskeletal: Normal range of motion and moves all extremeties.      Lymphadenopathy: No inguinal adenopathy noted on the right or left side.    Neurological: She is alert and oriented to person, place, and time.    Skin: Skin is warm and dry. No rash noted. She is not diaphoretic. No cyanosis or erythema. No pallor. Nails show no clubbing.    Psychiatric: She has a normal mood and affect. Her speech is normal and behavior is normal. Judgment and thought content normal.             Assessment:        1. Encounter for gynecological examination without abnormal finding    2. Screening for  "colon cancer    3. Menorrhagia with regular cycle    4. Herpes simplex vulvovaginitis    5. Fatigue, unspecified type    6. HPV in female               Plan:   Labs today  U/s ordered  Accepts trial of suppressive therapy 1000mg  Rx sent    Reviewed options for hot flushes, including lifestyle measures such as avoiding caffeine, dressing in layers, avoiding hot foods/drinks, soy milk before bed, sleeping with a fan    Continue menstrual calendar    The patient is given a full explanation of the ubiquitous nature of HPV.  Some research suggests that at least three out of four people who have sex will get a genital HPV infection at some time in their lives.  Sexual contact with an infected partner, regardless of the sex of the partner, is the most common way the virus is spread.  Most often there are no signs or symptoms of genital HPV infection. High risk viruses can cause abnormal pap smears, but they do not cause genital warts.  In most women, the immune system destroys the virus before it causes cancer.  But in some women, HPV is not destroyed and can lead to precancer or cancer in those women.  There is no "cure" for HPV.      Reviewed updated recommendations for pap smears (every 3 years) in low risk patients.  Recommend annual pelvic exams.  Reviewed recommendations for annual CBE.  Next pap due in 2026.   RTC 1 year or sooner prn.  To PCP for other health maintenance.  Cologuard ordered for January.    Encounter for gynecological examination without abnormal finding  -     Liquid-Based Pap Smear, Screening  -     HPV High Risk Genotypes, PCR    Screening for colon cancer  -     Cologuard Screening (Multitarget Stool DNA); Future; Expected date: 10/11/2023    Menorrhagia with regular cycle  -     TSH; Future; Expected date: 10/11/2023  -     CBC Auto Differential; Future; Expected date: 10/11/2023  -     FERRITIN; Future; Expected date: 10/11/2023  -     IRON AND TIBC; Future; Expected date: 10/11/2023  -     " US Pelvis Comp with Transvag NON-OB (xpd; Future; Expected date: 10/11/2023  -     Follicle Stimulating Hormone; Future; Expected date: 10/18/2023    Herpes simplex vulvovaginitis  -     valACYclovir (VALTREX) 1000 MG tablet; Take 1 tablet (1,000 mg total) by mouth once daily.  Dispense: 90 tablet; Refill: 3    Fatigue, unspecified type  -     TSH; Future; Expected date: 10/11/2023  -     CBC Auto Differential; Future; Expected date: 10/11/2023  -     FERRITIN; Future; Expected date: 10/11/2023  -     IRON AND TIBC; Future; Expected date: 10/11/2023    HPV in female  -     Liquid-Based Pap Smear, Screening  -     HPV High Risk Genotypes, PCR

## 2023-10-12 ENCOUNTER — HOSPITAL ENCOUNTER (OUTPATIENT)
Dept: RADIOLOGY | Facility: HOSPITAL | Age: 48
Discharge: HOME | End: 2023-10-12
Attending: NURSE PRACTITIONER
Payer: COMMERCIAL

## 2023-10-12 DIAGNOSIS — N92.0 MENORRHAGIA WITH REGULAR CYCLE: ICD-10-CM

## 2023-10-12 PROBLEM — D21.9 FIBROIDS: Status: ACTIVE | Noted: 2023-10-12

## 2023-10-12 PROCEDURE — 76830 TRANSVAGINAL US NON-OB: CPT | Mod: 26,,, | Performed by: RADIOLOGY

## 2023-10-12 PROCEDURE — 76830 US PELVIS COMP WITH TRANSVAG NON-OB (XPD): ICD-10-PCS | Mod: 26,,, | Performed by: RADIOLOGY

## 2023-10-12 PROCEDURE — 76856 US PELVIS COMP WITH TRANSVAG NON-OB (XPD): ICD-10-PCS | Mod: 26,,, | Performed by: RADIOLOGY

## 2023-10-12 PROCEDURE — 76856 US EXAM PELVIC COMPLETE: CPT | Mod: 26,,, | Performed by: RADIOLOGY

## 2023-10-12 PROCEDURE — 76830 TRANSVAGINAL US NON-OB: CPT | Mod: TC

## 2023-10-15 LAB
FINAL PATHOLOGIC DIAGNOSIS: NORMAL
Lab: NORMAL

## 2023-10-16 LAB
HPV HR 12 DNA SPEC QL NAA+PROBE: NEGATIVE
HPV16 AG SPEC QL: NEGATIVE
HPV18 DNA SPEC QL NAA+PROBE: NEGATIVE

## 2023-11-02 ENCOUNTER — OFFICE VISIT (OUTPATIENT)
Dept: OBSTETRICS AND GYNECOLOGY | Facility: CLINIC | Age: 48
End: 2023-11-02
Payer: COMMERCIAL

## 2023-11-02 VITALS
SYSTOLIC BLOOD PRESSURE: 120 MMHG | WEIGHT: 179 LBS | DIASTOLIC BLOOD PRESSURE: 84 MMHG | HEIGHT: 63 IN | BODY MASS INDEX: 31.71 KG/M2

## 2023-11-02 DIAGNOSIS — N92.0 MENORRHAGIA WITH REGULAR CYCLE: Primary | ICD-10-CM

## 2023-11-02 DIAGNOSIS — D25.1 FIBROIDS, INTRAMURAL: ICD-10-CM

## 2023-11-02 PROCEDURE — 99999 PR PBB SHADOW E&M-EST. PATIENT-LVL III: CPT | Mod: PBBFAC,,, | Performed by: OBSTETRICS & GYNECOLOGY

## 2023-11-02 PROCEDURE — 99213 PR OFFICE/OUTPT VISIT, EST, LEVL III, 20-29 MIN: ICD-10-PCS | Mod: S$GLB,,, | Performed by: OBSTETRICS & GYNECOLOGY

## 2023-11-02 PROCEDURE — 99999 PR PBB SHADOW E&M-EST. PATIENT-LVL III: ICD-10-PCS | Mod: PBBFAC,,, | Performed by: OBSTETRICS & GYNECOLOGY

## 2023-11-02 PROCEDURE — 99213 OFFICE O/P EST LOW 20 MIN: CPT | Mod: S$GLB,,, | Performed by: OBSTETRICS & GYNECOLOGY

## 2023-11-02 NOTE — LETTER
November 2, 2023      Tay SMILEY  [address]  [place] [address]  Phone: [phone]  Fax: [phone]       Patient: Kathia Arreola   YOB: 1975  Date of Visit: 11/02/2023    To Whom It May Concern:    Eb Arreola  was at [facility] on 11/02/2023. The patient may return to work/school on 11/03/23 with no restrictions. If you have any questions or concerns, or if I can be of further assistance, please do not hesitate to contact me.    Sincerely,    Sandy Hansen MA

## 2023-11-02 NOTE — PROGRESS NOTES
Subjective:       Patient ID: Kathia Arreola is a 48 y.o. female.    Chief Complaint:  Consult      History of Present Illness  HPI  Here for follow up  Reports menses worsening      worsening menses for the last three months.  Monthly; unable to leave the home the first two days.  Heavy with bad cramping; super tampon with overnight pad changing frequently; a lot of quarter sized clots.  Ibuprofen or hot showers a couple times a day with mild relief.      Hgb 11  Not currently sexually active  GYN & OB History  Patient's last menstrual period was 10/25/2023.   Date of Last Pap: 10/15/2023    OB History    Para Term  AB Living   2 2 2 0 0 2   SAB IAB Ectopic Multiple Live Births   0 0 0 0 2      # Outcome Date GA Lbr Rashid/2nd Weight Sex Delivery Anes PTL Lv   2 Term      Vag-Spont   ELVIE   1 Term      Vag-Spont   ELVIE       Review of Systems  Review of Systems   Genitourinary:  Positive for menorrhagia and menstrual problem.   All other systems reviewed and are negative.          Objective:      Physical Exam:   Constitutional: She is oriented to person, place, and time. She appears well-developed and well-nourished.     Eyes: Pupils are equal, round, and reactive to light. Conjunctivae and EOM are normal.      Pulmonary/Chest: Effort normal. Right breast exhibits no mass, no nipple discharge, no skin change and no tenderness. Left breast exhibits no mass, no nipple discharge, no skin change and no tenderness. Breasts are symmetrical.        Abdominal: Soft.     Genitourinary: Right adnexum displays no mass and no tenderness. Left adnexum displays no mass and no tenderness. No erythema,  no vaginal discharge, bleeding, rectocele, cystocele or unspecified prolapse of vaginal walls in the vagina. Normal urethral meatus.Urethra findings: no urethral massBladder findings: no bladder distention and no bladder tenderness          Musculoskeletal: Normal range of motion and moves all extremeties.        Neurological: She is alert and oriented to person, place, and time.    Skin: Skin is warm.    Psychiatric: She has a normal mood and affect. Her behavior is normal.           Assessment:        1. Menorrhagia with regular cycle    2. Fibroids, intramural               Plan:      Reviewed options for menorrhagia  Ocp, 3 mo ocp, depo provera, mirena iud, lysteda; myfembre/oriahhn  Ablation, hyserectomy  Hx of amenorrhea with depo provera but had more frequent out breaks --so stopped  Reviewed ablation/hysterectomy procedure  All questions answered  Info given on dexter jorge

## 2023-11-03 ENCOUNTER — PATIENT MESSAGE (OUTPATIENT)
Dept: OBSTETRICS AND GYNECOLOGY | Facility: CLINIC | Age: 48
End: 2023-11-03
Payer: COMMERCIAL

## 2023-11-07 ENCOUNTER — PATIENT MESSAGE (OUTPATIENT)
Dept: OBSTETRICS AND GYNECOLOGY | Facility: CLINIC | Age: 48
End: 2023-11-07
Payer: COMMERCIAL

## 2023-11-13 ENCOUNTER — TELEPHONE (OUTPATIENT)
Dept: OBSTETRICS AND GYNECOLOGY | Facility: CLINIC | Age: 48
End: 2023-11-13
Payer: COMMERCIAL

## 2023-11-13 ENCOUNTER — PATIENT MESSAGE (OUTPATIENT)
Dept: OBSTETRICS AND GYNECOLOGY | Facility: CLINIC | Age: 48
End: 2023-11-13
Payer: COMMERCIAL

## 2023-11-13 NOTE — TELEPHONE ENCOUNTER
Attempted to contact pt regarding appt provider will not be in clinic Friday 11/24 pt did not answer, lvm for pt to give office a callback, [employer] message sent.

## 2023-11-27 ENCOUNTER — OFFICE VISIT (OUTPATIENT)
Dept: OBSTETRICS AND GYNECOLOGY | Facility: CLINIC | Age: 48
End: 2023-11-27
Payer: COMMERCIAL

## 2023-11-27 VITALS — SYSTOLIC BLOOD PRESSURE: 122 MMHG | BODY MASS INDEX: 32.41 KG/M2 | WEIGHT: 183 LBS | DIASTOLIC BLOOD PRESSURE: 82 MMHG

## 2023-11-27 DIAGNOSIS — Z30.011 OCP (ORAL CONTRACEPTIVE PILLS) INITIATION: Primary | ICD-10-CM

## 2023-11-27 DIAGNOSIS — N92.0 MENORRHAGIA WITH REGULAR CYCLE: ICD-10-CM

## 2023-11-27 DIAGNOSIS — N94.6 DYSMENORRHEA: ICD-10-CM

## 2023-11-27 LAB
B-HCG UR QL: NEGATIVE
CTP QC/QA: YES

## 2023-11-27 PROCEDURE — 81025 POCT URINE PREGNANCY: ICD-10-PCS | Mod: S$GLB,,, | Performed by: NURSE PRACTITIONER

## 2023-11-27 PROCEDURE — 99213 PR OFFICE/OUTPT VISIT, EST, LEVL III, 20-29 MIN: ICD-10-PCS | Mod: S$GLB,,, | Performed by: NURSE PRACTITIONER

## 2023-11-27 PROCEDURE — 81025 URINE PREGNANCY TEST: CPT | Mod: S$GLB,,, | Performed by: NURSE PRACTITIONER

## 2023-11-27 PROCEDURE — 99213 OFFICE O/P EST LOW 20 MIN: CPT | Mod: S$GLB,,, | Performed by: NURSE PRACTITIONER

## 2023-11-27 PROCEDURE — 99999 PR PBB SHADOW E&M-EST. PATIENT-LVL III: CPT | Mod: PBBFAC,,, | Performed by: NURSE PRACTITIONER

## 2023-11-27 PROCEDURE — 99999 PR PBB SHADOW E&M-EST. PATIENT-LVL III: ICD-10-PCS | Mod: PBBFAC,,, | Performed by: NURSE PRACTITIONER

## 2023-11-27 RX ORDER — LEVONORGESTREL AND ETHINYL ESTRADIOL 0.1-0.02MG
1 KIT ORAL DAILY
Qty: 84 TABLET | Refills: 3 | Status: SHIPPED | OUTPATIENT
Start: 2023-11-27 | End: 2024-11-27

## 2023-11-27 RX ORDER — PHENIRAMINE MALEATE, PHENYLEPHRINE HCL 17.5; 1 MG/1; MG/1
TABLET ORAL
COMMUNITY
Start: 2023-03-18

## 2023-11-27 NOTE — PROGRESS NOTES
Subjective:       Patient ID: Kathia Arreola is a 48 y.o. female.    Chief Complaint:  Contraception      History of Present Illness  HPI   present for birth control consult  Did have second opinion with an OB/GYN  Decided against ablation and hyst  Interested in low dose pills    GYN & OB History  Patient's last menstrual period was 2023 (exact date).   Date of Last Pap: 10/15/2023    OB History    Para Term  AB Living   2 2 2 0 0 2   SAB IAB Ectopic Multiple Live Births   0 0 0 0 2      # Outcome Date GA Lbr Rashid/2nd Weight Sex Delivery Anes PTL Lv   2 Term      Vag-Spont   ELVIE   1 Term      Vag-Spont   ELVIE       Review of Systems  Review of Systems   Genitourinary:  Positive for dysmenorrhea and menorrhagia.           Objective:      Physical Exam:   Constitutional: She is oriented to person, place, and time. She appears well-developed and well-nourished. No distress.    HENT:   Head: Normocephalic and atraumatic.    Eyes: Pupils are equal, round, and reactive to light. Conjunctivae and EOM are normal.      Pulmonary/Chest: Effort normal.                  Musculoskeletal: Normal range of motion and moves all extremeties.       Neurological: She is alert and oriented to person, place, and time.    Skin: Skin is warm and dry. No rash noted. She is not diaphoretic. No erythema. No pallor.    Psychiatric: She has a normal mood and affect. Her behavior is normal. Judgment and thought content normal.             Assessment:        1. OCP (oral contraceptive pills) initiation    2. Menorrhagia with regular cycle    3. Dysmenorrhea               Plan:   Discussed risks of DVT development with birth control use.  Pt denies history of blood clots, DVT, cardiac issues, HTN, CVA, gallbladder disease, liver disease, smoking, migraines with an aura, or adrenal disease.  Pt denies family hx of DVT.  Pt chose OCPs.  Rx sent for aviane    Instructed pt to start pills on today with one daily.  Set a  reminder to prevent forgetting.  May experience nausea, HAs, or irregular bleeding in the first three months.    Med check 3 months  Will monitor her BP at home    Continue annual well woman exam.    OCP (oral contraceptive pills) initiation  -     levonorgestrel-ethinyl estradiol (AVIANE,ALESSE,LESSINA) 0.1-20 mg-mcg per tablet; Take 1 tablet by mouth once daily.  Dispense: 84 tablet; Refill: 3  -     POCT urine pregnancy    Menorrhagia with regular cycle  -     levonorgestrel-ethinyl estradiol (AVIANE,ALESSE,LESSINA) 0.1-20 mg-mcg per tablet; Take 1 tablet by mouth once daily.  Dispense: 84 tablet; Refill: 3    Dysmenorrhea  -     levonorgestrel-ethinyl estradiol (AVIANE,ALESSE,LESSINA) 0.1-20 mg-mcg per tablet; Take 1 tablet by mouth once daily.  Dispense: 84 tablet; Refill: 3

## 2024-02-01 LAB — NONINV COLON CA DNA+OCC BLD SCRN STL QL: NEGATIVE

## 2024-02-08 PROBLEM — R53.83 OTHER FATIGUE: Status: ACTIVE | Noted: 2024-02-08

## 2024-02-08 PROBLEM — Z78.0 MENOPAUSE: Status: ACTIVE | Noted: 2024-02-08

## 2024-02-26 PROBLEM — M41.9 SCOLIOSIS: Status: ACTIVE | Noted: 2024-02-26

## 2024-02-26 PROBLEM — E61.1 IRON DEFICIENCY: Status: ACTIVE | Noted: 2024-02-26

## 2024-02-26 PROBLEM — E55.9 VITAMIN D DEFICIENCY: Status: ACTIVE | Noted: 2024-02-26

## 2024-02-26 PROBLEM — R20.0 NUMBNESS OF LEFT FOOT: Status: ACTIVE | Noted: 2024-02-26

## 2024-02-26 PROBLEM — E53.8 B12 DEFICIENCY: Status: ACTIVE | Noted: 2024-02-26

## 2024-04-15 ENCOUNTER — OFFICE VISIT (OUTPATIENT)
Dept: OBSTETRICS AND GYNECOLOGY | Facility: CLINIC | Age: 49
End: 2024-04-15
Payer: COMMERCIAL

## 2024-04-15 VITALS
HEIGHT: 63 IN | SYSTOLIC BLOOD PRESSURE: 120 MMHG | WEIGHT: 184.94 LBS | BODY MASS INDEX: 32.77 KG/M2 | DIASTOLIC BLOOD PRESSURE: 80 MMHG

## 2024-04-15 DIAGNOSIS — N76.0 ACUTE VAGINITIS: Primary | ICD-10-CM

## 2024-04-15 DIAGNOSIS — Z12.31 ENCOUNTER FOR SCREENING MAMMOGRAM FOR MALIGNANT NEOPLASM OF BREAST: ICD-10-CM

## 2024-04-15 DIAGNOSIS — B96.89 BV (BACTERIAL VAGINOSIS): ICD-10-CM

## 2024-04-15 DIAGNOSIS — N76.0 BV (BACTERIAL VAGINOSIS): ICD-10-CM

## 2024-04-15 PROBLEM — D25.9 UTERINE LEIOMYOMA: Status: ACTIVE | Noted: 2023-10-12

## 2024-04-15 LAB
GARDNERELLA VAGINALIS: ABNORMAL
OTHER MICROSC. OBSERVATIONS: ABNORMAL
POC BACTERIAL VAGINOSIS: ABNORMAL
POC CLUE CELLS: POSITIVE
TRICHOMONAS, POC: NEGATIVE
YEAST WET PREP: NEGATIVE

## 2024-04-15 PROCEDURE — 87210 SMEAR WET MOUNT SALINE/INK: CPT | Mod: QW,S$GLB,, | Performed by: NURSE PRACTITIONER

## 2024-04-15 PROCEDURE — 99213 OFFICE O/P EST LOW 20 MIN: CPT | Mod: S$GLB,,, | Performed by: NURSE PRACTITIONER

## 2024-04-15 PROCEDURE — 99999 PR PBB SHADOW E&M-EST. PATIENT-LVL III: CPT | Mod: PBBFAC,,, | Performed by: NURSE PRACTITIONER

## 2024-04-15 RX ORDER — METRONIDAZOLE 500 MG/1
500 TABLET ORAL 2 TIMES DAILY
Qty: 14 TABLET | Refills: 0 | Status: SHIPPED | OUTPATIENT
Start: 2024-04-15 | End: 2024-04-22

## 2024-04-15 RX ORDER — METRONIDAZOLE 7.5 MG/G
1 GEL VAGINAL NIGHTLY
Qty: 70 G | Refills: 0 | Status: SHIPPED | OUTPATIENT
Start: 2024-04-15 | End: 2024-04-15 | Stop reason: CLARIF

## 2024-04-15 NOTE — PROGRESS NOTES
Subjective:       Patient ID: Kathia Arreola is a 48 y.o. female.    Chief Complaint:  vaginal irritation      History of Present Illness  HPI   present for vaginal burning  Had intercourse once since hyst with usual partner with condom  Saw Dr. KHALIDA Wall 2024   Negative for trich then; did not get results back for BV or yeast  Gonorrhea and chlamydia negative  Mainly baths changed her soap to pink dove and mens dove  Plain epsom salt    GYN & OB History  Patient's last menstrual period was 2023 (exact date).   Date of Last Pap: 10/15/2023    OB History    Para Term  AB Living   2 2 2 0 0 2   SAB IAB Ectopic Multiple Live Births   0 0 0 0 2      # Outcome Date GA Lbr Rashid/2nd Weight Sex Type Anes PTL Lv   2 Term      Vag-Spont   ELVIE   1 Term      Vag-Spont   ELVIE       Review of Systems  Review of Systems   Genitourinary:  Positive for vaginal pain (burning).           Objective:      Physical Exam:   Constitutional: She is oriented to person, place, and time. She appears well-developed and well-nourished. No distress.    HENT:   Head: Normocephalic and atraumatic.    Eyes: Pupils are equal, round, and reactive to light. Conjunctivae and EOM are normal.     Cardiovascular:  Normal rate.             Pulmonary/Chest: Effort normal.        Abdominal: Soft. She exhibits no distension. There is no abdominal tenderness. There is no rebound and no guarding. Hernia confirmed negative in the right inguinal area and confirmed negative in the left inguinal area.     Genitourinary:    Inguinal canal normal.   Rectum:      No external hemorrhoid.      Pelvic exam was performed with patient in the lithotomy position.   The external female genitalia was normal.   No external genitalia lesions identified,Genitalia hair distrobution normal .     Labial bartholins normal.There is no rash, tenderness, lesion or injury on the right labia. There is no rash, tenderness, lesion or injury on the left labia.  There is vaginal discharge (white, cloudy discharge; small amt; no odor) in the vagina. No erythema, tenderness or bleeding in the vagina.    No foreign body in the vagina.      No signs of injury in the vagina.   Cervix is absent.Uterus is absent. Normal urethral meatus.Urethral Meatus exhibits: urethral lesionUrethra findings: no urethral mass, no tenderness, no urethral scarring and prolapsed   Genitourinary Comments: Wet prep -- many clue cells; no yeast or trich noted             Musculoskeletal: Normal range of motion and moves all extremeties.      Lymphadenopathy: No inguinal adenopathy noted on the right or left side.    Neurological: She is alert and oriented to person, place, and time.    Skin: Skin is warm and dry. No rash noted. She is not diaphoretic. No erythema. No pallor.    Psychiatric: She has a normal mood and affect. Her behavior is normal. Judgment and thought content normal.             Assessment:        1. Acute vaginitis    2. BV (bacterial vaginosis)    3. Encounter for screening mammogram for malignant neoplasm of breast               Plan:   Rx sent for flagyl. Avoid alcohol and intercourse while taking.  Vaginal hygiene practices discussed.    mammo ordered, continue yearly until age 77    Continue annual well woman exam.    Acute vaginitis  -     POCT Wet Prep    BV (bacterial vaginosis)  -     Discontinue: metroNIDAZOLE (METROGEL) 0.75 % (37.5mg/5 gram) vaginal gel; Place 1 applicator vaginally nightly. for 5 days  Dispense: 70 g; Refill: 0  -     metroNIDAZOLE (FLAGYL) 500 MG tablet; Take 1 tablet (500 mg total) by mouth 2 (two) times daily. for 7 days  Dispense: 14 tablet; Refill: 0    Encounter for screening mammogram for malignant neoplasm of breast  -     Mammo Digital Screening Bilat w/ [facility]; Future; Expected date: 09/30/2024

## 2024-07-01 PROBLEM — R06.83 SNORING: Status: ACTIVE | Noted: 2024-07-01

## 2024-07-23 ENCOUNTER — PATIENT MESSAGE (OUTPATIENT)
Dept: OBSTETRICS AND GYNECOLOGY | Facility: CLINIC | Age: 49
End: 2024-07-23
Payer: COMMERCIAL

## 2024-07-23 DIAGNOSIS — A60.04 HERPES SIMPLEX VULVOVAGINITIS: Primary | ICD-10-CM

## 2024-07-24 RX ORDER — ACYCLOVIR 50 MG/G
OINTMENT TOPICAL
Qty: 30 G | Refills: 3 | Status: SHIPPED | OUTPATIENT
Start: 2024-07-24 | End: 2024-08-03

## 2024-10-16 ENCOUNTER — OFFICE VISIT (OUTPATIENT)
Dept: OBSTETRICS AND GYNECOLOGY | Facility: CLINIC | Age: 49
End: 2024-10-16
Payer: COMMERCIAL

## 2024-10-16 VITALS
BODY MASS INDEX: 33.51 KG/M2 | HEIGHT: 63 IN | WEIGHT: 189.13 LBS | SYSTOLIC BLOOD PRESSURE: 118 MMHG | DIASTOLIC BLOOD PRESSURE: 74 MMHG

## 2024-10-16 DIAGNOSIS — Z01.419 ENCOUNTER FOR GYNECOLOGICAL EXAMINATION WITHOUT ABNORMAL FINDING: Primary | ICD-10-CM

## 2024-10-16 PROCEDURE — 88175 CYTOPATH C/V AUTO FLUID REDO: CPT | Performed by: NURSE PRACTITIONER

## 2024-10-16 PROCEDURE — 99999 PR PBB SHADOW E&M-EST. PATIENT-LVL III: CPT | Mod: PBBFAC,,, | Performed by: NURSE PRACTITIONER

## 2024-10-16 PROCEDURE — 99396 PREV VISIT EST AGE 40-64: CPT | Mod: S$GLB,,, | Performed by: NURSE PRACTITIONER

## 2024-10-16 NOTE — PROGRESS NOTES
Subjective:       Patient ID: Kathia Arreola is a 49 y.o. female.    Chief Complaint:  Well Woman (Breast pain (last week))      History of Present Illness  HPI  Annual Exam-Premenopausal   presents for annual exam. The patient has no complaints today. The patient is sexually active; MM relationship x6 months; had testing done.  No issues with intercourse. GYN screening history: last pap: approximate date 10/11/2023 and was normal and last mammogram: approximate date 2023 and was normal. Cyst; f/u dx.  + HPV  other types.  The patient wears seatbelts: yes. The patient participates in regular exercise: no. Hurt her foot.  Has the patient ever been transfused or tattooed?: no. The patient reports that there is not domestic violence in her life.    Night sweats every other night.  Wakes up drenched.  Just restarted dr. Patel regularly.  Breast pain, sharp; lasting about a week. Will be intermittent.    No bladder/bowel issues.      GYN & OB History  Patient's last menstrual period was 2023 (exact date).   Date of Last Pap: 10/17/2024    OB History    Para Term  AB Living   2 2 2 0 0 2   SAB IAB Ectopic Multiple Live Births   0 0 0 0 2      # Outcome Date GA Lbr Rashid/2nd Weight Sex Type Anes PTL Lv   2 Term      Vag-Spont   ELVIE   1 Term      Vag-Spont   ELVIE       Review of Systems  Review of Systems   Constitutional:  Negative for activity change, appetite change, chills, fatigue and fever.        +night sweats   HENT:  Negative for nasal congestion and mouth sores.    Respiratory:  Negative for cough, shortness of breath and wheezing.    Cardiovascular:  Negative for chest pain.   Gastrointestinal:  Negative for abdominal pain, constipation, diarrhea, nausea and vomiting.   Endocrine: Negative for hair loss and hot flashes.   Genitourinary:  Negative for bladder incontinence, decreased libido, dyspareunia, dysuria, frequency, genital sores, hot flashes, pelvic pain, urgency,  vaginal discharge, vaginal pain, urinary incontinence, postcoital bleeding, postmenopausal bleeding, vaginal dryness and vaginal odor.   Musculoskeletal:  Negative for back pain.   Integumentary:  Positive for breast tenderness. Negative for breast mass, nipple discharge and breast skin changes.   Neurological:  Negative for headaches.   Hematological:  Negative for adenopathy.   Psychiatric/Behavioral:  Negative for depression and sleep disturbance. The patient is not nervous/anxious.    All other systems reviewed and are negative.  Breast: Positive for tenderness.Negative for breast self exam, lump, mass, mastodynia, nipple discharge and skin changes          Objective:      Physical Exam:   Constitutional: She is oriented to person, place, and time. She appears well-developed and well-nourished. No distress.    HENT:   Head: Normocephalic and atraumatic.   Nose: Nose normal.    Eyes: Pupils are equal, round, and reactive to light. Conjunctivae and EOM are normal. Right eye exhibits no discharge. Left eye exhibits no discharge.     Cardiovascular:  Normal rate, regular rhythm and normal heart sounds.      Exam reveals no gallop, no friction rub, no clubbing, no cyanosis and no edema.       No murmur heard.   Pulmonary/Chest: Effort normal and breath sounds normal. No respiratory distress. She has no decreased breath sounds. She has no wheezes. She has no rhonchi. She has no rales. She exhibits no tenderness. Right breast exhibits no inverted nipple, no mass, no nipple discharge, no skin change, no tenderness, no bleeding and no swelling. Left breast exhibits no inverted nipple, no mass, no nipple discharge, no skin change, no tenderness, no bleeding and no swelling. Breasts are symmetrical.        Abdominal: Soft. Bowel sounds are normal. She exhibits no distension. There is no abdominal tenderness. There is no rebound and no guarding. Hernia confirmed negative in the right inguinal area and confirmed negative in  the left inguinal area.     Genitourinary:    Inguinal canal, right adnexa and left adnexa normal.   Rectum:      No external hemorrhoid.      Pelvic exam was performed with patient in the lithotomy position.   The external female genitalia was normal.   No external genitalia lesions identified,Genitalia hair distrobution normal .     Labial bartholins normal.There is no rash, tenderness, lesion or injury on the right labia. There is no rash, tenderness, lesion or injury on the left labia. Right adnexum displays no mass, no tenderness and no fullness. Left adnexum displays no mass, no tenderness and no fullness. No erythema, vaginal discharge, tenderness or bleeding in the vagina.    No foreign body in the vagina.      No signs of injury in the vagina.   Cervix is absent.   pap smear completedUterus is absent. Normal urethral meatus.Urethral Meatus exhibits: no urethral lesionUrethra findings: no urethral mass, no tenderness, no urethral scarring and no prolapsedBladder findings: no bladder distention and no bladder tenderness          Musculoskeletal: Normal range of motion and moves all extremeties.      Lymphadenopathy: No inguinal adenopathy noted on the right or left side.    Neurological: She is alert and oriented to person, place, and time.    Skin: Skin is warm and dry. No rash noted. She is not diaphoretic. No cyanosis or erythema. No pallor. Nails show no clubbing.    Psychiatric: She has a normal mood and affect. Her speech is normal and behavior is normal. Judgment and thought content normal.             Assessment:        1. Encounter for gynecological examination without abnormal finding               Plan:   decrease caffeine intake, ibuprofen, vit E, or evening primrose oil for relief, also good support bra    Reviewed updated recommendations for pap smears (every 3 years) in low risk patients.  Recommend annual pelvic exams.  Reviewed recommendations for annual CBE.  Next pap due in 2027 -- will  monitor until normal x10 years.   RTC 1 year or sooner prn.  To PCP for other health maintenance.  mammo scheduled, continue yearly until age 77      Encounter for gynecological examination without abnormal finding  -     Cancel: Liquid-Based Pap Smear, Screening    Other orders  -     Pap Smear, Thin Prep with Reflex to HPV

## 2024-10-17 LAB
CLINICAL INFO: NORMAL
DATE OF PREVIOUS PAP: NORMAL
DATE PREVIOUS BX: NO
LMP START DATE: NORMAL
SPECIMEN SOURCE CVX/VAG CYTO: NORMAL

## 2024-10-29 ENCOUNTER — HOSPITAL ENCOUNTER (OUTPATIENT)
Dept: RADIOLOGY | Facility: HOSPITAL | Age: 49
Discharge: HOME | End: 2024-10-29
Attending: NURSE PRACTITIONER
Payer: COMMERCIAL

## 2024-10-29 VITALS — HEIGHT: 63 IN | BODY MASS INDEX: 33.51 KG/M2 | WEIGHT: 189.13 LBS

## 2024-10-29 DIAGNOSIS — R92.8 ABNORMAL MAMMOGRAM: ICD-10-CM

## 2024-10-29 PROCEDURE — 76642 ULTRASOUND BREAST LIMITED: CPT | Mod: 26,RT,, | Performed by: RADIOLOGY

## 2024-10-29 PROCEDURE — 77066 DX MAMMO INCL CAD BI: CPT | Mod: TC

## 2024-10-29 PROCEDURE — 77062 BREAST TOMOSYNTHESIS BI: CPT | Mod: 26,,, | Performed by: RADIOLOGY

## 2024-10-29 PROCEDURE — 77066 DX MAMMO INCL CAD BI: CPT | Mod: 26,,, | Performed by: RADIOLOGY

## 2024-10-29 PROCEDURE — 76642 ULTRASOUND BREAST LIMITED: CPT | Mod: TC,RT

## 2024-11-08 ENCOUNTER — PATIENT MESSAGE (OUTPATIENT)
Dept: OBSTETRICS AND GYNECOLOGY | Facility: CLINIC | Age: 49
End: 2024-11-08
Payer: COMMERCIAL

## 2024-11-08 DIAGNOSIS — A60.04 HERPES SIMPLEX VULVOVAGINITIS: ICD-10-CM

## 2024-11-08 RX ORDER — VALACYCLOVIR HYDROCHLORIDE 1 G/1
1000 TABLET, FILM COATED ORAL DAILY
Qty: 90 TABLET | Refills: 3 | Status: SHIPPED | OUTPATIENT
Start: 2024-11-08 | End: 2025-11-09

## 2025-05-20 NOTE — PROGRESS NOTES
Subjective:       Patient ID: Kathia Arreola is a 47 y.o. female.    Chief Complaint:  Vaginal Pain      History of Present Illness  HPI  Presents with herpes outbreak.  Pt with known hx of HSV.  Took daily valtrex suppression for several years, but switched jobs and new insurance does not cover valtrex, so she stopped taking it about 1 year ago.  Did well until earlier this month when she developed an outbreak.  That seemed to have resolved, but developed another ulcer last week.  Was able to get a coupon for valtrex, so is now on 5 days of bid valtrex treatment and acylovir ointment.  Symptoms seem to be better.  Also due for pap and MMG.  Would like GC/CT testing as well.    GYN & OB History  Patient's last menstrual period was 2022 (exact date).   Date of Last Pap: No result found    OB History    Para Term  AB Living   2 0 0 0 0 2   SAB IAB Ectopic Multiple Live Births   0 0 0 0 2      # Outcome Date GA Lbr Rashid/2nd Weight Sex Delivery Anes PTL Lv   2       Vag-Spont   ELVIE   1       Vag-Spont   ELVIE       Review of Systems  Review of Systems   Constitutional:  Negative for activity change, fatigue, fever and unexpected weight change.   Gastrointestinal:  Negative for abdominal pain, bloating, constipation, diarrhea, nausea and vomiting.   Endocrine: Negative for hair loss and hot flashes.   Genitourinary:  Positive for genital sores and menorrhagia (hx of heavy periods; pt did well on LoEstrin, but that is too expensive for her now). Negative for dysmenorrhea, dyspareunia, dysuria, frequency, hematuria, menstrual problem, pelvic pain, urgency, vaginal bleeding, vaginal discharge, vaginal pain, postcoital bleeding and vaginal odor.   Integumentary:  Negative for rash, hair changes, breast mass, nipple discharge and breast skin changes.   Hematological:  Negative for adenopathy.   Breast: Negative for mass, mastodynia, nipple discharge and skin changes        Objective:     Physical Exam:   Constitutional: She is oriented to person, place, and time. She appears well-developed and well-nourished. No distress.             Abdominal: Soft. She exhibits no distension and no mass. There is no abdominal tenderness. There is no rebound and no guarding. Hernia confirmed negative in the right inguinal area and confirmed negative in the left inguinal area.     Genitourinary:    Vagina normal.            Pelvic exam was performed with patient supine.   There is no rash, tenderness, lesion or injury on the right labia. There is no rash, tenderness, lesion or injury on the left labia. Right adnexum displays no mass, no tenderness and no fullness. Left adnexum displays no mass, no tenderness and no fullness. No erythema,  no vaginal discharge, tenderness, bleeding, rectocele, cystocele or unspecified prolapse of vaginal walls in the vagina.    No foreign body in the vagina.      No signs of injury in the vagina.   Cervix exhibits no motion tenderness, no discharge and no friability. Uterus is not deviated, not enlarged, not fixed and not tender.               Neurological: She is alert and oriented to person, place, and time.     Psychiatric: She has a normal mood and affect.      Wet prep: normal vaginal squamous epithelial cells    Assessment:        1. Herpes simplex vulvovaginitis    2. Cervical cancer screening    3. Screen for STD (sexually transmitted disease)    4. Encounter for screening mammogram for malignant neoplasm of breast                Plan:      Kathia was seen today for vaginal pain.    Diagnoses and all orders for this visit:    Herpes simplex vulvovaginitis  -     acyclovir (ZOVIRAX) 400 MG tablet; Take 1 tablet (400 mg total) by mouth 2 (two) times daily.    Cervical cancer screening  -     Liquid-Based Pap Smear, Screening  -     HPV High Risk Genotypes, PCR    Screen for STD (sexually transmitted disease)  -     C. trachomatis/N. gonorrhoeae by AMP DNA    Encounter for  n/a screening mammogram for malignant neoplasm of breast  -     Mammo Digital Screening Bilat w/ [facility]; Future   Complete 5 day course of valtrex treatment.  Discussed switching to acyclovir daily suppression, as this should be less expensive for her.  Pt advised to check costs at [facility].  Cna message me via [employer] if she'd like her prescription transferred.  RTC 1 year or sooner prn.